# Patient Record
Sex: FEMALE | Race: WHITE | NOT HISPANIC OR LATINO | Employment: OTHER | URBAN - METROPOLITAN AREA
[De-identification: names, ages, dates, MRNs, and addresses within clinical notes are randomized per-mention and may not be internally consistent; named-entity substitution may affect disease eponyms.]

---

## 2017-11-24 ENCOUNTER — APPOINTMENT (OUTPATIENT)
Dept: RADIOLOGY | Facility: MEDICAL CENTER | Age: 77
DRG: 069 | End: 2017-11-24
Attending: EMERGENCY MEDICINE
Payer: MEDICARE

## 2017-11-24 ENCOUNTER — RESOLUTE PROFESSIONAL BILLING HOSPITAL PROF FEE (OUTPATIENT)
Dept: HOSPITALIST | Facility: MEDICAL CENTER | Age: 77
End: 2017-11-24
Payer: MEDICARE

## 2017-11-24 ENCOUNTER — HOSPITAL ENCOUNTER (OUTPATIENT)
Dept: RADIOLOGY | Facility: MEDICAL CENTER | Age: 77
End: 2017-11-24

## 2017-11-24 ENCOUNTER — HOSPITAL ENCOUNTER (INPATIENT)
Facility: MEDICAL CENTER | Age: 77
LOS: 2 days | DRG: 069 | End: 2017-11-26
Attending: EMERGENCY MEDICINE | Admitting: HOSPITALIST
Payer: MEDICARE

## 2017-11-24 DIAGNOSIS — I10 HYPERTENSION, UNSPECIFIED TYPE: ICD-10-CM

## 2017-11-24 DIAGNOSIS — R53.1 WEAKNESS: ICD-10-CM

## 2017-11-24 PROBLEM — I16.0 HYPERTENSIVE URGENCY: Status: ACTIVE | Noted: 2017-11-24

## 2017-11-24 PROBLEM — G45.9 TIA (TRANSIENT ISCHEMIC ATTACK): Status: ACTIVE | Noted: 2017-11-24

## 2017-11-24 LAB
ALBUMIN SERPL BCP-MCNC: 4 G/DL (ref 3.2–4.9)
ALBUMIN/GLOB SERPL: 1.1 G/DL
ALP SERPL-CCNC: 81 U/L (ref 30–99)
ALT SERPL-CCNC: 8 U/L (ref 2–50)
ANION GAP SERPL CALC-SCNC: 12 MMOL/L (ref 0–11.9)
APTT PPP: 26.2 SEC (ref 24.7–36)
AST SERPL-CCNC: 17 U/L (ref 12–45)
BASOPHILS # BLD AUTO: 1.4 % (ref 0–1.8)
BASOPHILS # BLD: 0.11 K/UL (ref 0–0.12)
BILIRUB SERPL-MCNC: 0.6 MG/DL (ref 0.1–1.5)
BNP SERPL-MCNC: 170 PG/ML (ref 0–100)
BUN SERPL-MCNC: 9 MG/DL (ref 8–22)
CALCIUM SERPL-MCNC: 9.9 MG/DL (ref 8.5–10.5)
CHLORIDE SERPL-SCNC: 103 MMOL/L (ref 96–112)
CO2 SERPL-SCNC: 24 MMOL/L (ref 20–33)
CREAT SERPL-MCNC: 0.73 MG/DL (ref 0.5–1.4)
EOSINOPHIL # BLD AUTO: 0.15 K/UL (ref 0–0.51)
EOSINOPHIL NFR BLD: 1.9 % (ref 0–6.9)
ERYTHROCYTE [DISTWIDTH] IN BLOOD BY AUTOMATED COUNT: 44.5 FL (ref 35.9–50)
GFR SERPL CREATININE-BSD FRML MDRD: >60 ML/MIN/1.73 M 2
GLOBULIN SER CALC-MCNC: 3.5 G/DL (ref 1.9–3.5)
GLUCOSE SERPL-MCNC: 87 MG/DL (ref 65–99)
HCT VFR BLD AUTO: 44.3 % (ref 37–47)
HGB BLD-MCNC: 15.4 G/DL (ref 12–16)
IMM GRANULOCYTES # BLD AUTO: 0.02 K/UL (ref 0–0.11)
IMM GRANULOCYTES NFR BLD AUTO: 0.3 % (ref 0–0.9)
INR PPP: 0.98 (ref 0.87–1.13)
LYMPHOCYTES # BLD AUTO: 2.82 K/UL (ref 1–4.8)
LYMPHOCYTES NFR BLD: 36.2 % (ref 22–41)
MCH RBC QN AUTO: 31 PG (ref 27–33)
MCHC RBC AUTO-ENTMCNC: 34.8 G/DL (ref 33.6–35)
MCV RBC AUTO: 89.3 FL (ref 81.4–97.8)
MONOCYTES # BLD AUTO: 0.58 K/UL (ref 0–0.85)
MONOCYTES NFR BLD AUTO: 7.4 % (ref 0–13.4)
NEUTROPHILS # BLD AUTO: 4.11 K/UL (ref 2–7.15)
NEUTROPHILS NFR BLD: 52.8 % (ref 44–72)
NRBC # BLD AUTO: 0 K/UL
NRBC BLD AUTO-RTO: 0 /100 WBC
PLATELET # BLD AUTO: 276 K/UL (ref 164–446)
PMV BLD AUTO: 9.2 FL (ref 9–12.9)
POTASSIUM SERPL-SCNC: 3.5 MMOL/L (ref 3.6–5.5)
PROT SERPL-MCNC: 7.5 G/DL (ref 6–8.2)
PROTHROMBIN TIME: 12.7 SEC (ref 12–14.6)
RBC # BLD AUTO: 4.96 M/UL (ref 4.2–5.4)
SODIUM SERPL-SCNC: 139 MMOL/L (ref 135–145)
TROPONIN I SERPL-MCNC: <0.01 NG/ML (ref 0–0.04)
TSH SERPL DL<=0.005 MIU/L-ACNC: 1.73 UIU/ML (ref 0.3–3.7)
WBC # BLD AUTO: 7.8 K/UL (ref 4.8–10.8)

## 2017-11-24 PROCEDURE — 99285 EMERGENCY DEPT VISIT HI MDM: CPT

## 2017-11-24 PROCEDURE — 94760 N-INVAS EAR/PLS OXIMETRY 1: CPT

## 2017-11-24 PROCEDURE — 770020 HCHG ROOM/CARE - TELE (206)

## 2017-11-24 PROCEDURE — 83880 ASSAY OF NATRIURETIC PEPTIDE: CPT

## 2017-11-24 PROCEDURE — 85025 COMPLETE CBC W/AUTO DIFF WBC: CPT

## 2017-11-24 PROCEDURE — 99223 1ST HOSP IP/OBS HIGH 75: CPT | Mod: AI,25 | Performed by: HOSPITALIST

## 2017-11-24 PROCEDURE — 93005 ELECTROCARDIOGRAM TRACING: CPT | Performed by: EMERGENCY MEDICINE

## 2017-11-24 PROCEDURE — 84443 ASSAY THYROID STIM HORMONE: CPT

## 2017-11-24 PROCEDURE — 85730 THROMBOPLASTIN TIME PARTIAL: CPT

## 2017-11-24 PROCEDURE — 83036 HEMOGLOBIN GLYCOSYLATED A1C: CPT

## 2017-11-24 PROCEDURE — 96365 THER/PROPH/DIAG IV INF INIT: CPT

## 2017-11-24 PROCEDURE — 85610 PROTHROMBIN TIME: CPT

## 2017-11-24 PROCEDURE — 99406 BEHAV CHNG SMOKING 3-10 MIN: CPT | Performed by: HOSPITALIST

## 2017-11-24 PROCEDURE — 80053 COMPREHEN METABOLIC PANEL: CPT

## 2017-11-24 PROCEDURE — 36415 COLL VENOUS BLD VENIPUNCTURE: CPT

## 2017-11-24 PROCEDURE — 84484 ASSAY OF TROPONIN QUANT: CPT

## 2017-11-24 PROCEDURE — 71010 DX-CHEST-PORTABLE (1 VIEW): CPT

## 2017-11-24 RX ORDER — ASPIRIN 81 MG/1
324 TABLET, CHEWABLE ORAL DAILY
Status: DISCONTINUED | OUTPATIENT
Start: 2017-11-25 | End: 2017-11-26 | Stop reason: HOSPADM

## 2017-11-24 RX ORDER — ATORVASTATIN CALCIUM 40 MG/1
40 TABLET, FILM COATED ORAL EVERY EVENING
Status: DISCONTINUED | OUTPATIENT
Start: 2017-11-24 | End: 2017-11-26 | Stop reason: HOSPADM

## 2017-11-24 RX ORDER — ASPIRIN 81 MG/1
81 TABLET, CHEWABLE ORAL EVERY MORNING
Status: ON HOLD | COMMUNITY
End: 2017-11-26

## 2017-11-24 RX ORDER — METOPROLOL SUCCINATE 100 MG/1
100 TABLET, EXTENDED RELEASE ORAL EVERY MORNING
Status: ON HOLD | COMMUNITY
End: 2023-03-10

## 2017-11-24 RX ORDER — LABETALOL HYDROCHLORIDE 5 MG/ML
10 INJECTION, SOLUTION INTRAVENOUS EVERY 4 HOURS PRN
Status: DISCONTINUED | OUTPATIENT
Start: 2017-11-24 | End: 2017-11-25

## 2017-11-24 RX ORDER — NICOTINE 21 MG/24HR
14 PATCH, TRANSDERMAL 24 HOURS TRANSDERMAL
Status: DISCONTINUED | OUTPATIENT
Start: 2017-11-25 | End: 2017-11-26 | Stop reason: HOSPADM

## 2017-11-24 RX ORDER — ASPIRIN 325 MG
325 TABLET ORAL DAILY
Status: DISCONTINUED | OUTPATIENT
Start: 2017-11-25 | End: 2017-11-26 | Stop reason: HOSPADM

## 2017-11-24 RX ORDER — ONDANSETRON 4 MG/1
4 TABLET, ORALLY DISINTEGRATING ORAL EVERY 4 HOURS PRN
Status: DISCONTINUED | OUTPATIENT
Start: 2017-11-24 | End: 2017-11-26 | Stop reason: HOSPADM

## 2017-11-24 RX ORDER — METOPROLOL SUCCINATE 50 MG/1
100 TABLET, EXTENDED RELEASE ORAL EVERY MORNING
Status: DISCONTINUED | OUTPATIENT
Start: 2017-11-25 | End: 2017-11-26 | Stop reason: HOSPADM

## 2017-11-24 RX ORDER — ACETAMINOPHEN 325 MG/1
650 TABLET ORAL EVERY 6 HOURS PRN
Status: DISCONTINUED | OUTPATIENT
Start: 2017-11-24 | End: 2017-11-26 | Stop reason: HOSPADM

## 2017-11-24 RX ORDER — POLYETHYLENE GLYCOL 3350 17 G/17G
1 POWDER, FOR SOLUTION ORAL
Status: DISCONTINUED | OUTPATIENT
Start: 2017-11-24 | End: 2017-11-26 | Stop reason: HOSPADM

## 2017-11-24 RX ORDER — ASPIRIN 81 MG/1
81 TABLET, CHEWABLE ORAL EVERY MORNING
Status: DISCONTINUED | OUTPATIENT
Start: 2017-11-25 | End: 2017-11-24

## 2017-11-24 RX ORDER — AMOXICILLIN 250 MG
2 CAPSULE ORAL 2 TIMES DAILY
Status: DISCONTINUED | OUTPATIENT
Start: 2017-11-25 | End: 2017-11-26 | Stop reason: HOSPADM

## 2017-11-24 RX ORDER — BISACODYL 10 MG
10 SUPPOSITORY, RECTAL RECTAL
Status: DISCONTINUED | OUTPATIENT
Start: 2017-11-24 | End: 2017-11-26 | Stop reason: HOSPADM

## 2017-11-24 RX ORDER — ASPIRIN 300 MG/1
300 SUPPOSITORY RECTAL DAILY
Status: DISCONTINUED | OUTPATIENT
Start: 2017-11-25 | End: 2017-11-26 | Stop reason: HOSPADM

## 2017-11-24 RX ORDER — ONDANSETRON 2 MG/ML
4 INJECTION INTRAMUSCULAR; INTRAVENOUS EVERY 4 HOURS PRN
Status: DISCONTINUED | OUTPATIENT
Start: 2017-11-24 | End: 2017-11-26 | Stop reason: HOSPADM

## 2017-11-24 RX ORDER — M-VIT,TX,IRON,MINS/CALC/FOLIC 27MG-0.4MG
1 TABLET ORAL EVERY MORNING
Status: DISCONTINUED | OUTPATIENT
Start: 2017-11-25 | End: 2017-11-26 | Stop reason: HOSPADM

## 2017-11-24 ASSESSMENT — LIFESTYLE VARIABLES
TOTAL SCORE: 0
EVER FELT BAD OR GUILTY ABOUT YOUR DRINKING: NO
EVER HAD A DRINK FIRST THING IN THE MORNING TO STEADY YOUR NERVES TO GET RID OF A HANGOVER: NO
DO YOU DRINK ALCOHOL: YES
EVER_SMOKED: YES
TOTAL SCORE: 0
HAVE YOU EVER FELT YOU SHOULD CUT DOWN ON YOUR DRINKING: NO
TOTAL SCORE: 0
HAVE PEOPLE ANNOYED YOU BY CRITICIZING YOUR DRINKING: NO
CONSUMPTION TOTAL: INCOMPLETE

## 2017-11-24 ASSESSMENT — COPD QUESTIONNAIRES
DURING THE PAST 4 WEEKS HOW MUCH DID YOU FEEL SHORT OF BREATH: NONE/LITTLE OF THE TIME
HAVE YOU SMOKED AT LEAST 100 CIGARETTES IN YOUR ENTIRE LIFE: YES
COPD SCREENING SCORE: 4
DO YOU EVER COUGH UP ANY MUCUS OR PHLEGM?: NO/ONLY WITH OCCASIONAL COLDS OR INFECTIONS

## 2017-11-25 PROBLEM — Z72.0 TOBACCO ABUSE: Status: ACTIVE | Noted: 2017-11-25

## 2017-11-25 LAB
ANION GAP SERPL CALC-SCNC: 11 MMOL/L (ref 0–11.9)
BUN SERPL-MCNC: 12 MG/DL (ref 8–22)
CALCIUM SERPL-MCNC: 9.7 MG/DL (ref 8.5–10.5)
CHLORIDE SERPL-SCNC: 101 MMOL/L (ref 96–112)
CO2 SERPL-SCNC: 23 MMOL/L (ref 20–33)
CREAT SERPL-MCNC: 0.75 MG/DL (ref 0.5–1.4)
ERYTHROCYTE [DISTWIDTH] IN BLOOD BY AUTOMATED COUNT: 44.8 FL (ref 35.9–50)
EST. AVERAGE GLUCOSE BLD GHB EST-MCNC: 111 MG/DL
GFR SERPL CREATININE-BSD FRML MDRD: >60 ML/MIN/1.73 M 2
GLUCOSE SERPL-MCNC: 118 MG/DL (ref 65–99)
HBA1C MFR BLD: 5.5 % (ref 0–5.6)
HCT VFR BLD AUTO: 43.1 % (ref 37–47)
HGB BLD-MCNC: 14.7 G/DL (ref 12–16)
LV EJECT FRACT  99904: 55
LV EJECT FRACT MOD 2C 99903: 71.63
LV EJECT FRACT MOD 4C 99902: 60.04
LV EJECT FRACT MOD BP 99901: 65.68
MAGNESIUM SERPL-MCNC: 1.9 MG/DL (ref 1.5–2.5)
MCH RBC QN AUTO: 30.7 PG (ref 27–33)
MCHC RBC AUTO-ENTMCNC: 34.1 G/DL (ref 33.6–35)
MCV RBC AUTO: 90 FL (ref 81.4–97.8)
PLATELET # BLD AUTO: 282 K/UL (ref 164–446)
PMV BLD AUTO: 9.4 FL (ref 9–12.9)
POTASSIUM SERPL-SCNC: 4.2 MMOL/L (ref 3.6–5.5)
RBC # BLD AUTO: 4.79 M/UL (ref 4.2–5.4)
SODIUM SERPL-SCNC: 135 MMOL/L (ref 135–145)
WBC # BLD AUTO: 8.9 K/UL (ref 4.8–10.8)

## 2017-11-25 PROCEDURE — A9270 NON-COVERED ITEM OR SERVICE: HCPCS | Performed by: HOSPITALIST

## 2017-11-25 PROCEDURE — 80048 BASIC METABOLIC PNL TOTAL CA: CPT

## 2017-11-25 PROCEDURE — 99233 SBSQ HOSP IP/OBS HIGH 50: CPT | Performed by: HOSPITALIST

## 2017-11-25 PROCEDURE — 700102 HCHG RX REV CODE 250 W/ 637 OVERRIDE(OP): Performed by: HOSPITALIST

## 2017-11-25 PROCEDURE — 36415 COLL VENOUS BLD VENIPUNCTURE: CPT

## 2017-11-25 PROCEDURE — 93306 TTE W/DOPPLER COMPLETE: CPT | Mod: 26 | Performed by: INTERNAL MEDICINE

## 2017-11-25 PROCEDURE — 83735 ASSAY OF MAGNESIUM: CPT

## 2017-11-25 PROCEDURE — G8978 MOBILITY CURRENT STATUS: HCPCS | Mod: CJ

## 2017-11-25 PROCEDURE — 700111 HCHG RX REV CODE 636 W/ 250 OVERRIDE (IP): Performed by: HOSPITALIST

## 2017-11-25 PROCEDURE — G8987 SELF CARE CURRENT STATUS: HCPCS | Mod: CI

## 2017-11-25 PROCEDURE — 97161 PT EVAL LOW COMPLEX 20 MIN: CPT

## 2017-11-25 PROCEDURE — G8988 SELF CARE GOAL STATUS: HCPCS | Mod: CI

## 2017-11-25 PROCEDURE — 770020 HCHG ROOM/CARE - TELE (206)

## 2017-11-25 PROCEDURE — 85027 COMPLETE CBC AUTOMATED: CPT

## 2017-11-25 PROCEDURE — 700111 HCHG RX REV CODE 636 W/ 250 OVERRIDE (IP): Performed by: FAMILY MEDICINE

## 2017-11-25 PROCEDURE — 97166 OT EVAL MOD COMPLEX 45 MIN: CPT

## 2017-11-25 PROCEDURE — 93306 TTE W/DOPPLER COMPLETE: CPT

## 2017-11-25 PROCEDURE — G8979 MOBILITY GOAL STATUS: HCPCS | Mod: CI

## 2017-11-25 RX ORDER — LABETALOL HYDROCHLORIDE 5 MG/ML
10 INJECTION, SOLUTION INTRAVENOUS EVERY 4 HOURS PRN
Status: DISCONTINUED | OUTPATIENT
Start: 2017-11-25 | End: 2017-11-26 | Stop reason: HOSPADM

## 2017-11-25 RX ORDER — POTASSIUM CHLORIDE 20 MEQ/1
20 TABLET, EXTENDED RELEASE ORAL 2 TIMES DAILY
Status: DISCONTINUED | OUTPATIENT
Start: 2017-11-25 | End: 2017-11-26 | Stop reason: HOSPADM

## 2017-11-25 RX ORDER — HYDRALAZINE HYDROCHLORIDE 20 MG/ML
10 INJECTION INTRAMUSCULAR; INTRAVENOUS ONCE
Status: COMPLETED | OUTPATIENT
Start: 2017-11-25 | End: 2017-11-25

## 2017-11-25 RX ORDER — HYDRALAZINE HYDROCHLORIDE 20 MG/ML
10 INJECTION INTRAMUSCULAR; INTRAVENOUS
Status: DISCONTINUED | OUTPATIENT
Start: 2017-11-25 | End: 2017-11-26 | Stop reason: HOSPADM

## 2017-11-25 RX ADMIN — NICOTINE 14 MG: 14 PATCH, EXTENDED RELEASE TRANSDERMAL at 05:24

## 2017-11-25 RX ADMIN — POTASSIUM CHLORIDE 20 MEQ: 1500 TABLET, EXTENDED RELEASE ORAL at 21:46

## 2017-11-25 RX ADMIN — ATORVASTATIN CALCIUM 40 MG: 40 TABLET, FILM COATED ORAL at 21:46

## 2017-11-25 RX ADMIN — METOPROLOL SUCCINATE 100 MG: 50 TABLET, EXTENDED RELEASE ORAL at 07:57

## 2017-11-25 RX ADMIN — ASPIRIN 325 MG: 325 TABLET, COATED ORAL at 07:57

## 2017-11-25 RX ADMIN — HYDRALAZINE HYDROCHLORIDE 10 MG: 20 INJECTION INTRAMUSCULAR; INTRAVENOUS at 01:40

## 2017-11-25 RX ADMIN — ENOXAPARIN SODIUM 40 MG: 100 INJECTION SUBCUTANEOUS at 07:58

## 2017-11-25 RX ADMIN — ONDANSETRON 4 MG: 4 TABLET, ORALLY DISINTEGRATING ORAL at 08:40

## 2017-11-25 RX ADMIN — POTASSIUM CHLORIDE 20 MEQ: 1500 TABLET, EXTENDED RELEASE ORAL at 10:56

## 2017-11-25 RX ADMIN — MULTIPLE VITAMINS W/ MINERALS TAB 1 TABLET: TAB at 07:58

## 2017-11-25 ASSESSMENT — COGNITIVE AND FUNCTIONAL STATUS - GENERAL
SUGGESTED CMS G CODE MODIFIER MOBILITY: CJ
MOBILITY SCORE: 22
DAILY ACTIVITIY SCORE: 23
CLIMB 3 TO 5 STEPS WITH RAILING: A LITTLE
HELP NEEDED FOR BATHING: A LITTLE
WALKING IN HOSPITAL ROOM: A LITTLE
SUGGESTED CMS G CODE MODIFIER DAILY ACTIVITY: CI

## 2017-11-25 ASSESSMENT — ENCOUNTER SYMPTOMS
EYES NEGATIVE: 1
ABDOMINAL PAIN: 0
CONSTIPATION: 0
NAUSEA: 1
DIARRHEA: 0
LOSS OF CONSCIOUSNESS: 0
PALPITATIONS: 0
HEMOPTYSIS: 0
DIZZINESS: 1
PSYCHIATRIC NEGATIVE: 1
CHILLS: 0
CARDIOVASCULAR NEGATIVE: 1
RESPIRATORY NEGATIVE: 1
SEIZURES: 0
FALLS: 0
DEPRESSION: 0
SPEECH CHANGE: 0
SHORTNESS OF BREATH: 0
HEARTBURN: 0
HEADACHES: 1
BLOOD IN STOOL: 0
COUGH: 0
DOUBLE VISION: 0
TREMORS: 1
CONSTITUTIONAL NEGATIVE: 1
MUSCULOSKELETAL NEGATIVE: 1
SENSORY CHANGE: 0
FOCAL WEAKNESS: 1
MYALGIAS: 0
TINGLING: 0
FEVER: 0
WHEEZING: 0
NAUSEA: 0
HEADACHES: 0
VOMITING: 0
BRUISES/BLEEDS EASILY: 0
DIAPHORESIS: 0

## 2017-11-25 ASSESSMENT — LIFESTYLE VARIABLES: SUBSTANCE_ABUSE: 0

## 2017-11-25 ASSESSMENT — PAIN SCALES - GENERAL
PAINLEVEL_OUTOF10: 0
PAINLEVEL_OUTOF10: 0

## 2017-11-25 ASSESSMENT — GAIT ASSESSMENTS
DISTANCE (FEET): 150
GAIT LEVEL OF ASSIST: STAND BY ASSIST

## 2017-11-25 ASSESSMENT — PATIENT HEALTH QUESTIONNAIRE - PHQ9
2. FEELING DOWN, DEPRESSED, IRRITABLE, OR HOPELESS: NOT AT ALL
SUM OF ALL RESPONSES TO PHQ9 QUESTIONS 1 AND 2: 0
1. LITTLE INTEREST OR PLEASURE IN DOING THINGS: NOT AT ALL
SUM OF ALL RESPONSES TO PHQ QUESTIONS 1-9: 0

## 2017-11-25 ASSESSMENT — ACTIVITIES OF DAILY LIVING (ADL): TOILETING: INDEPENDENT

## 2017-11-25 NOTE — ED NOTES
EKG completed. Labs drawn and sent. Pt. Updated on the POC per Dr. Weaver. X-Ray at bedside. Call light within reach. Will continue to monitor.   normal...

## 2017-11-25 NOTE — CONSULTS
Neuro phone conversation (Telemedicine CODE STROKE service):    Patient transferred from [facility] for inpatient neuro evaluation.  NOT a code stroke.   Recommended permissive HTN, MRI/brain and neuro checks on inpatient service overnight.    Please call IN-HOUSE neurology team for inpatient consult in AM.

## 2017-11-25 NOTE — ED NOTES
Susan Garcia  77 y.o.  Chief Complaint   Patient presents with   • Weakness     Per report from EMS, pt. had sudden onset of right sided weakness and facial droop starting at 9pm last night. Symptoms have since resolved. 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 Pt. is on 2.5 mg of nicardipine to treat elevated blood pressure that per report was in the 200s systollically prior to medication administration.     /76   Pulse 70   Temp 36.8 °C (98.2 °F)   Resp 16   Ht 1.524 m (5')   Wt 56.2 kg (124 lb)   SpO2 98%   BMI 24.22 kg/m²   Pt. Has a 20G IV in her left AC placed PTA.

## 2017-11-25 NOTE — ED NOTES
Med rec completed per pt at bedside  Allergies reviewed - NKDA  No ABX in last 30 days   Pt has her metoprolol bottle with her, reviewed and returned it to pt

## 2017-11-25 NOTE — PROGRESS NOTES
Report received at bedside, assumed care. Pt transported to Tele 7 via gurney. Ambulated to bed steady with handheld assist. A&O x 4. Tingling present in right hand. Declines pain. /112, pulse sustaining in 50's. On call hospitalist paged for Hydralazine, orders received to give one time dose. No other concerns, complains or distress. Tele box on. Chart reviewed. Bed in lowest position, treaded slipper sock on, and call light within reach.

## 2017-11-25 NOTE — PROGRESS NOTES
Bedside report received from night RN.  Pt assessed A&Ox4, no c/o pain, no sob noted.  POC discussed with pt, all questions answered.  Pt states all needs are met. CHair at bedside, patient wants to wait til 11 for family to arrive before getting up from bed.  Sitting up high in bed for breakfast. Bed strip alarm on, bed in lowest position, call light within reach.  Will continue to monitor

## 2017-11-25 NOTE — PROGRESS NOTES
[facility] Hospitalist Progress Note    Date of Service: 2017    Chief Complaint  77 y.o. female admitted 2017 with right-sided deficit.    Interval Problem Update  Patient this point is found to have right sided weakness as well as gait disturbance. Patient's past pointing is off the latency exam is positive on the right side. Patient displays pending a swallow evaluation. MRI of the head was done but reading is pending. No acute findings noted on initial evaluation. The patient's echo and carotid ultrasound is pending patient remains on statin and aspirin. Continue with PT OT evaluation and swallow evaluation. Currently patient is not in pain.    Consultants/Specialty  Neurology    Disposition  To be determined        Review of Systems   Constitutional: Negative.  Negative for chills, diaphoresis and fever.   HENT: Negative.    Eyes: Negative.  Negative for double vision.   Respiratory: Negative.  Negative for cough, hemoptysis and wheezing.    Cardiovascular: Negative.  Negative for chest pain, palpitations and leg swelling.   Gastrointestinal: Positive for nausea. Negative for abdominal pain, blood in stool, constipation, diarrhea, heartburn and vomiting.   Genitourinary: Negative.  Negative for frequency, hematuria and urgency.   Musculoskeletal: Negative.  Negative for joint pain.   Skin: Negative.  Negative for itching and rash.   Neurological: Positive for dizziness, tremors, focal weakness and headaches. Negative for seizures and loss of consciousness.   Endo/Heme/Allergies: Negative.  Does not bruise/bleed easily.   Psychiatric/Behavioral: Negative.  Negative for depression, substance abuse and suicidal ideas.      Physical Exam  Laboratory/Imaging   Hemodynamics  Temp (24hrs), Av.8 °C (98.2 °F), Min:36.4 °C (97.6 °F), Max:37.2 °C (98.9 °F)   Temperature: 36.4 °C (97.6 °F)  Pulse  Av.8  Min: 50  Max: 74 Heart Rate (Monitored): (!) 49  Blood Pressure : 157/75, NIBP: (!) 176/74       Respiratory      Respiration: 18, Pulse Oximetry: 94 %, O2 Daily Delivery Respiratory : Room Air with O2 Available     Work Of Breathing / Effort: Mild  RUL Breath Sounds: Clear, RML Breath Sounds: Clear, RLL Breath Sounds: Clear, PHIL Breath Sounds: Diminished, LLL Breath Sounds: Clear    Fluids    Intake/Output Summary (Last 24 hours) at 11/25/17 1429  Last data filed at 11/25/17 1000   Gross per 24 hour   Intake              480 ml   Output              800 ml   Net             -320 ml       Nutrition  Orders Placed This Encounter   Procedures   • Diet Order     Standing Status:   Standing     Number of Occurrences:   1     Order Specific Question:   Diet:     Answer:   Regular [1]     Physical Exam   Constitutional: She appears well-developed and well-nourished.   HENT:   Head: Normocephalic and atraumatic.   Right Ear: External ear normal.   Left Ear: External ear normal.   Nose: Nose normal.   Mouth/Throat: Oropharynx is clear and moist.   Eyes: Conjunctivae and EOM are normal. Pupils are equal, round, and reactive to light.   Neck: Normal range of motion. Neck supple. No JVD present. No thyromegaly present.   Cardiovascular: Normal rate and regular rhythm.    Murmur heard.  Pulmonary/Chest: Effort normal and breath sounds normal. She has no wheezes. She has no rales. She exhibits no tenderness.   Abdominal: Soft. Bowel sounds are normal. She exhibits no distension and no mass. There is no tenderness. There is no rebound and no guarding.   Genitourinary:   Genitourinary Comments: Combs catheter   Musculoskeletal: Normal range of motion. She exhibits no edema or tenderness.   Lymphadenopathy:     She has no cervical adenopathy.   Neurological: She is alert. She has normal reflexes. She is not disoriented. She displays tremor. No cranial nerve deficit or sensory deficit. She exhibits abnormal muscle tone. She displays no seizure activity. Coordination and gait abnormal. GCS eye subscore is 4. GCS verbal  subscore is 5. GCS motor subscore is 6.   Past pointing off on the right and there is a droop on the latency evaluation on the right    Skin: Skin is warm and dry. No rash noted. No erythema.   Psychiatric: She has a normal mood and affect. Her behavior is normal. Judgment and thought content normal.   Nursing note and vitals reviewed.      Recent Labs      11/24/17 2035 11/25/17 0914   WBC  7.8  8.9   RBC  4.96  4.79   HEMOGLOBIN  15.4  14.7   HEMATOCRIT  44.3  43.1   MCV  89.3  90.0   MCH  31.0  30.7   MCHC  34.8  34.1   RDW  44.5  44.8   PLATELETCT  276  282   MPV  9.2  9.4     Recent Labs      11/24/17 2035 11/25/17 0914   SODIUM  139  135   POTASSIUM  3.5*  4.2   CHLORIDE  103  101   CO2  24  23   GLUCOSE  87  118*   BUN  9  12   CREATININE  0.73  0.75   CALCIUM  9.9  9.7     Recent Labs      11/24/17 2035   APTT  26.2   INR  0.98     Recent Labs      11/24/17 2035   BNPBTYPENAT  170*              Assessment/Plan     * TIA (transient ischemic attack)- (present on admission)   Assessment & Plan    Patient reported to the emergency room with right-sided deficits. Today to right-sided deficits continue she has at this point a drift on the right side as well as pass pointing is off. She is weaker on that side. There is no oral droop so. Speech is normal. Dysphagia assessment needs to be done.  Patient had an MRI of the head and MRA of the head. I have evaluated these myself and do not find acute strokes on a due to facial radiology reading is pending.  Echocardiogram is pending as his carotid ultrasound.  Patient now remains on aspirin and statin.        Hypertensive urgency- (present on admission)   Assessment & Plan    Permissive hypertension is allowed with the possibility of a stroke.        Tobacco abuse- (present on admission)   Assessment & Plan    -nicotine replacement protocol and cessation education provided for more than 10 minutes, discussed options of nicotine patch, acupuncture, medical  treatment with wellbutrin and chantix. Discussed other options. Code 14531            Reviewed items::  EKG reviewed, Labs reviewed, Medications reviewed and Radiology images reviewed  Combs catheter::  No Combs  DVT prophylaxis pharmacological::  Enoxaparin (Lovenox)  Ulcer Prophylaxis::  Not indicated

## 2017-11-25 NOTE — PROGRESS NOTES
Two RN skin check: Ears red and blanching. All other areas of skin assessed, no areas of concern.

## 2017-11-25 NOTE — FACE TO FACE
Face to Face Note  -  Durable Medical Equipment    Sebastian Taylor M.D. - NPI: [id number]  I certify that this patient is under my care and that they had a durable medical equipment(DME)face to face encounter by myself that meets the physician DME face-to-face encounter requirements with this patient on:    Date of encounter:   Patient:                    MRN:                       YOB: 2018  Susan Garcia  [id number]  1940     The encounter with the patient was in whole, or in part, for the following medical condition, which is the primary reason for durable medical equipment:  Post-Op Surgery    I certify that, based on my findings, the following durable medical equipment is medically necessary:  Walkers.    HOME O2 Saturation Measurements:(Values must be present for Home Oxygen orders)         ,     ,         My Clinical findings support the need for the above equipment due to:  Abnormal Gait    Supporting Symptoms: patient has not ambulated well for months, she needs home health and walker to help

## 2017-11-25 NOTE — THERAPY
"Physical Therapy Evaluation completed.   Bed Mobility:  Supine to Sit: Supervised  Transfers: Sit to Stand: Stand by Assist  Gait: Level Of Assist: Stand by Assist with No Equipment Needed       Plan of Care: Additional visit should pt remain in-house.  Discharge Recommendations: Equipment: No Equipment Needed. Post-acute therapy Discharge to home with outpatient for additional skilled therapy services.    Pt presents with decreased functional mobiltiy most related to R UE dyscoordination. Pt has difficulty manipulating objects as well as feeding herself. Has been educated on basic coordination exercises to perform. Hopefully OT can see pt to address as well. During gait, pt without overt LOB and was able to perform without device. She appears to not require FWW, and does not want to use a cane. She does have a walking stick at home and is agreeable to use. Anticipate that with continued mobiltiy, pt will progress such that she will not even need a walking stick. For now, she is in agreement to use to prevent fall. Daughter present who was receptive to education as well. Should pt remain in-house, could benefit from additional visit for safety. If she returns home, she will benefit from referral to outpatient PT to address R UE.     See \"Rehab Therapy-Acute\" Patient Summary Report for complete documentation.     "

## 2017-11-25 NOTE — CARE PLAN
Problem: Communication:  Goal: The ability to communicate needs accurately and effectively will improve  No deficits noted on speech or communication    Problem: Mobility:  Goal: Capacity to carry out activities will improve  Pt ambulatory with assistance, PT/OT ordered for evaluation

## 2017-11-25 NOTE — H&P
Hospital Medicine History and Physical    Date of Service  11/24/2017    Chief Complaint  Chief Complaint   Patient presents with   • Weakness     Per report from EMS, pt. had sudden onset of right sided weakness and facial droop starting at 9pm last night. Symptoms have since resolved. 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 Pt. is on 2.5 mg of nicardipine to treat elevated blood pressure that per report was in the 200s systollically prior to medication administration.       History of Presenting Illness  77 y.o. female who presented 11/24/2017 after being transferred from an outside facility for CVA and concern of hypertensive emergency. She states that on the day of presentation she woke up at 1 AM to the restroom and noticed that her right leg was shuffling, weak and she felt unsteady. She didn't think much of it at the time and went back to bed. Then, in the morning she noticed that her right hand was also having difficulty with fine motor skills such as grabbing cups or writing. CT head at the outside facility was negative and she was out of the TPA window. She was noted to be significantly hypertensive to the systolic 200s and was started on nicardipine drip and transferred for further evaluation.       Primary Care Physician  No primary care provider on file.    Consultants  None    Code Status  DNR    Review of Systems  Review of Systems   Constitutional: Negative for chills and fever.   HENT: Positive for congestion (recent URI or head cold 3-4 days prior).    Respiratory: Negative for shortness of breath.    Cardiovascular: Negative for chest pain, palpitations and leg swelling.   Gastrointestinal: Negative for abdominal pain, nausea and vomiting.   Genitourinary: Negative for dysuria.   Musculoskeletal: Negative for falls and myalgias.   Neurological: Positive for dizziness and focal weakness. Negative for tingling, sensory change, speech change, loss of consciousness and headaches.    Psychiatric/Behavioral: Negative.    All other systems reviewed and are negative.       Past Medical History  Past Medical History:   Diagnosis Date   • Hypertension    • Stroke (CMS-HCC)     Previous CVA.       Surgical History  No past surgical history on file.    Medications  No current facility-administered medications on file prior to encounter.      No current outpatient prescriptions on file prior to encounter.       Family History  History reviewed. No pertinent family history.  Mother with history of CVA and heart disease    Social History  Social History   Substance Use Topics   • Smoking status: Former Smoker   • Smokeless tobacco: Never Used   • Alcohol use No   Current smoker but is intermittent. She states that she started when she was quite young.  Has 8 grandchildren. Originally from [place] but moved to [place] for 22 years and now back in [place] to be closer with her family.    Allergies  No Known Allergies     Physical Exam  Laboratory   Hemodynamics  Temp (24hrs), Av.7 °C (98.1 °F), Min:36.6 °C (97.9 °F), Max:36.8 °C (98.2 °F)   Temperature: 36.6 °C (97.9 °F)  Pulse  Av.3  Min: 50  Max: 70 Heart Rate (Monitored): (!) 49  Blood Pressure : (!) 179/112, NIBP: (!) 176/74      Respiratory      Respiration: 16, Pulse Oximetry: 97 %, O2 Daily Delivery Respiratory : Room Air with O2 Available     Work Of Breathing / Effort: Mild  RUL Breath Sounds: Diminished, RML Breath Sounds: Diminished, RLL Breath Sounds: Diminished, PHIL Breath Sounds: Diminished, LLL Breath Sounds: Diminished    Physical Exam   Constitutional: She is oriented to person, place, and time. She appears well-developed and well-nourished. No distress.   HENT:   Mouth/Throat: Oropharynx is clear and moist.   Eyes: EOM are normal. Pupils are equal, round, and reactive to light. No scleral icterus.   Neck: Normal range of motion. Neck supple.   Cardiovascular: Regular rhythm and intact distal pulses.  Bradycardia present.     Pulmonary/Chest: Breath sounds normal. No respiratory distress.   Abdominal: Soft. She exhibits no distension. There is no tenderness.   Musculoskeletal: She exhibits no edema.   Neurological: She is alert and oriented to person, place, and time. She is not disoriented. No cranial nerve deficit.   4 out of 5 strength in the right upper extremity, 5 out of 5 for all other extremities. Difficulty with fine motor skills, finger tapping is delayed.   Skin: Skin is warm and dry.   Psychiatric: She has a normal mood and affect. Her behavior is normal.   Vitals reviewed.      Recent Labs      11/24/17 2035   WBC  7.8   RBC  4.96   HEMOGLOBIN  15.4   HEMATOCRIT  44.3   MCV  89.3   MCH  31.0   MCHC  34.8   RDW  44.5   PLATELETCT  276   MPV  9.2     Recent Labs      11/24/17 2035   SODIUM  139   POTASSIUM  3.5*   CHLORIDE  103   CO2  24   GLUCOSE  87   BUN  9   CREATININE  0.73   CALCIUM  9.9     Recent Labs      11/24/17 2035   ALTSGPT  8   ASTSGOT  17   ALKPHOSPHAT  81   TBILIRUBIN  0.6   GLUCOSE  87     Recent Labs      11/24/17 2035   APTT  26.2   INR  0.98     Recent Labs      11/24/17 2035   BNPBTYPENAT  170*         Lab Results   Component Value Date    TROPONINI <0.01 11/24/2017     Urinalysis:  No results found for: SPECGRAVITY, GLUCOSEUR, KETONES, NITRITE, WBCURINE, RBCURINE, BACTERIA, EPITHELCELL     Imaging  DX-CHEST-PORTABLE (1 VIEW)   Final Result      No acute cardiopulmonary abnormality identified.      OUTSIDE IMAGES-CT HEAD   Final Result      MR-BRAIN-W/O    (Results Pending)   MR-MRA HEAD-W/O    (Results Pending)   Carotid Duplex ([facility] [place] [facility] Only) - Do not order if CTA - Neck done in ER    (Results Pending)   Echocardiogram Comp W/O cont    (Results Pending)        Assessment/Plan     I anticipate this patient will require at least two midnights for appropriate medical management, necessitating inpatient admission.    * TIA (transient ischemic attack)- (present on admission)    Assessment & Plan    Patient with sudden onset right-sided deficits that have since resolved. She is out of the TPA window and CT head was negative. Case was discussed by ERP with neurology who recommended systolic blood pressure to 180.  - Consult neurology if neurologic status changes or if studies return positive  - MRI/MRA pending  - Carotid Doppler and echo pending  - Start full dose aspirin and statin        Hypertensive urgency- (present on admission)   Assessment & Plan    Hypertensive urgency versus emergency. Patient meets criteria was significantly elevated blood pressures but it is unclear if her weakness/TIA symptoms was associated with her secondary to hypertensive encephalopathy. Typically, one would see global deficits was supposed to focal. Given the apparent TIA we will allow for permissive hypertension but only up to 180/100.  - Discontinue Nicardipine drip  - Continue home metoprolol  - Labetalol available when necessary  - May need to adjust home antihypertensive regimen prior to discharge        Tobacco abuse- (present on admission)   Assessment & Plan    Counseled at least 3 minutes on 11/25/17. Strongly encouraged to quit  - Nicotine replacement PRN            VTE prophylaxis: lovenox.

## 2017-11-25 NOTE — ASSESSMENT & PLAN NOTE
Patient reported to the emergency room with right-sided deficits. Today to right-sided deficits continue she has at this point a drift on the right side as well as pass pointing is off. She is weaker on that side. There is no oral droop so. Speech is normal. Dysphagia assessment needs to be done.  Patient had an MRI of the head and MRA of the head. I have evaluated these myself and do not find acute strokes on a due to facial radiology reading is pending.  Echocardiogram is pending as his carotid ultrasound.  Patient now remains on aspirin and statin.

## 2017-11-25 NOTE — ED PROVIDER NOTES
ED Provider Note    Scribed for Sri Weaver M.D. by Robin Pedroza. 11/24/2017  8:14 PM    Means of arrival: Ambulance  History obtained from: patient  History limited by: None    CHIEF COMPLAINT  Chief Complaint   Patient presents with   • Weakness     Per report from EMS, pt. had sudden onset of right sided weakness and facial droop starting at 9pm last night. Symptoms have since resolved. 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 Pt. is on 2.5 mg of nicardipine to treat elevated blood pressure that per report was in the 200s systollically prior to medication administration.     HPI  Susan Garcia is a 77 y.o. female who presents to the Emergency Department as a transfer from [facility] for acute onset right sided weakness, concerning for CVA, onset of symptoms 1AM this morning. Per patient, she noticed she was unable to write anything down and she was dragging her foot when she walked.  She denies having any pain with this event. Patient reports having a similar symptoms due to CVA secondary to hypertension in the past. These symptoms in the past were to her left side, however have since resolved.  She denies associated headache, neck pain, vision changes, chest pain, shortness of breath, abdominal pain, fever, nausea, vomiting, or diarrhea. Associated symptoms include ear pressure, runny nose, and cough. Patient states that she has been compliant with her home medications. She is only on Metoprolol and a baby Aspirin daily. Patient reports feeling much improved at this time. She was treated with Nicardipine prior to transfer.     REVIEW OF SYSTEMS  Pertinent positive include facial droop, focal weakness, cough, runny nose, and ear pressure.   Pertinent negative include headache, neck pain, vision changes, chest pain, shortness of breath, abdominal pain, fever, nausea, vomiting, or diarrhea.   All other systems reviewed and are negative.  C.     PAST MEDICAL HISTORY  Hypertension     SOCIAL HISTORY  Social  History     Social History Main Topics   • Smoking status: Former Smoker   • Smokeless tobacco: Never Used   • Alcohol use No   • Drug use: No     SURGICAL HISTORY  patient denies any surgical history    CURRENT MEDICATIONS  None    ALLERGIES  No Known Allergies    PHYSICAL EXAM   VITAL SIGNS: /76   Pulse 70   Temp 36.8 °C (98.2 °F)   Resp 16   Ht 1.524 m (5')   Wt 56.2 kg (124 lb)   SpO2 98%   BMI 24.22 kg/m²    Constitutional: Alert in no apparent distress. Elderly female resting comfortably in bed.  HENT: Normocephalic, Atraumatic. Bilateral external ears normal. Nose normal.  Moist mucous membranes.  Oropharynx clear.  Eyes: Pupils are equal and reactive. Conjunctiva normal.   Neck: Supple, full range of motion  Heart: Regular rate and rhythm.  3/6 systolic murmurs noted  Lungs: No respiratory distress, normal work of breathing. Lungs clear to auscultation bilaterally.  Abdomen Soft, no distention.  No tenderness to palpation.  Musculoskeletal: Atraumatic. No obvious deformities noted.  No lower extremity edema.  Skin: Warm, Dry.  No erythema, No rash.   Neurologic: Alert and oriented x3. Moving all extremities spontaneously without focal deficits. No pronator drift, CN II-XII intact, strength 4+/5 in RUE and RLE, Strength 5/5 on LLE and LUE, sensation intact. No dysmetria.   Psychiatric: Affect normal, Mood normal, Appears appropriate and not intoxicated.    ED COURSE & MEDICAL DECISION MAKING  Patient Vitals for the past 24 hrs:   BP Temp Pulse Resp SpO2 Height Weight   11/24/17 2013 155/76 36.8 °C (98.2 °F) 70 16 98 % - -   11/24/17 2008 - - - - - 1.524 m (5') 56.2 kg (124 lb)     Medications administered:      Labs and imaging personally reviewed:    EKG  EKG personally reviewed by myself in the absence of a cardiologist showed:  SB at 58  LAD, Normal intervals  No ectopy. LVH.   No ST changes.   Biphasic T waves in I and AVL.   Impression: no previous EKG for comparison. Non specific EKG with  nonspecific T wave changes and LVH.      LABS  Labs Reviewed   COMP METABOLIC PANEL - Abnormal; Notable for the following:        Result Value    Potassium 3.5 (*)     Anion Gap 12.0 (*)     All other components within normal limits    Narrative:     Indicate which anticoagulants the patient is on:->UNKNOWN   BTYPE NATRIURETIC PEPTIDE - Abnormal; Notable for the following:     B Natriuretic Peptide 170 (*)     All other components within normal limits    Narrative:     Indicate which anticoagulants the patient is on:->UNKNOWN   CBC WITH DIFFERENTIAL    Narrative:     Indicate which anticoagulants the patient is on:->UNKNOWN   TROPONIN    Narrative:     Indicate which anticoagulants the patient is on:->UNKNOWN   PROTHROMBIN TIME    Narrative:     Indicate which anticoagulants the patient is on:->UNKNOWN   APTT    Narrative:     Indicate which anticoagulants the patient is on:->UNKNOWN   ESTIMATED GFR    Narrative:     Indicate which anticoagulants the patient is on:->UNKNOWN   TSH   HEMOGLOBIN A1C   CBC WITHOUT DIFFERENTIAL   BASIC METABOLIC PANEL   LIPID PROFILE     RADIOLOGY  DX-CHEST-PORTABLE (1 VIEW)   Final Result      No acute cardiopulmonary abnormality identified.      OUTSIDE IMAGES-CT HEAD   Final Result      MR-BRAIN-W/O    (Results Pending)   MR-MRA HEAD-W/O    (Results Pending)   Carotid Duplex ([facility] Only) - Do not order if CTA - Neck done in ER    (Results Pending)   Echocardiogram Comp W/O cont    (Results Pending)     Old records personally reviewed:  Patient is a transfer [facility] for concern for stroke NIHSS of 2 due to right sided weakness. Significantly hypertensive and was started on nicardipine drip. Her BP there was initially 234/101 and her head CT was negative.     MDM:    8:14 PM Patient seen and examined at bedside. Ordered for chest x ray, CBC, CMP, troponin, BNP, INR, APTT, and EKG to evaluate. I discussed with the patient the treatment plan. She understands the  plan and is agreeable.    Patient with history of hypertension and possible prior stroke who was transferred from outside facility with concern for acute CVA outside of the treatment window for IV alteplase. Patient was initially hypertensive and started on a nicardipine drip at outside hospital.  On arrival here, patient has improvement of her symptoms. She has possible trace right upper and right lower extremity weakness when compared to the left, however no other focal neurologic deficits. Reviewed outside hospital CT scan without evidence of acute intracranial hemorrhage or mass. EKG without evidence of acute ischemia or arrhythmia, troponin negative, doubt ACS. Patient without significant electrolyte abnormalities or anemia.  Chest x-ray without evidence of pneumothorax, pneumonia, pulmonary edema.    8:30 PM I paged Neurology     8:35 PM I discussed the case with on call [facility], Dr. Barnes. Plan to stop nicardipine drip at this time as she does not need strict blood pressure control.  Will obtain MRI and MRA to assess for infarct or stenosis. Neurology may be consult in the morning.    9:55 PM Recheck with the patient. Off of her Nicardipine drip her blood pressure is 147 systolic.     9:56 PM I discussed the case with Dr. Pike Hospitalist. She is aware of the patient and agrees to admit the patient into her care.    FINAL IMPRESSION  1. Weakness    2. Hypertension, unspecified type      -ADMIT-    Results, diagnoses, and treatment options were discussed with the patient and/or family. Patient verbalized understanding of plan of care.    , Robin Pedroza (Breanna), am scribing for, and in the presence of, Sri Weaver M.D.    Electronically signed by: Robin Pedroza (Breanna), 11/24/2017    ISri M.D. personally performed the services described in this documentation, as scribed by Robin Pedroza in my presence, and it is both accurate and complete.    The note accurately reflects work  and decisions made by me.  Sri Weaver  11/25/2017  12:50 AM

## 2017-11-25 NOTE — ASSESSMENT & PLAN NOTE
-nicotine replacement protocol and cessation education provided for more than 10 minutes, discussed options of nicotine patch, acupuncture, medical treatment with wellbutrin and chantix. Discussed other options. Code 09417

## 2017-11-25 NOTE — ED NOTES
Pt transferred to [facility] RN aware of pt's pending arrival and BP status, all belongings accounted for.

## 2017-11-26 VITALS
TEMPERATURE: 97.4 F | WEIGHT: 116.62 LBS | OXYGEN SATURATION: 98 % | BODY MASS INDEX: 22.9 KG/M2 | DIASTOLIC BLOOD PRESSURE: 93 MMHG | SYSTOLIC BLOOD PRESSURE: 140 MMHG | HEART RATE: 88 BPM | RESPIRATION RATE: 18 BRPM | HEIGHT: 60 IN

## 2017-11-26 LAB
CHOLEST SERPL-MCNC: 182 MG/DL (ref 100–199)
HDLC SERPL-MCNC: 44 MG/DL
LDLC SERPL CALC-MCNC: 120 MG/DL
TRIGL SERPL-MCNC: 92 MG/DL (ref 0–149)

## 2017-11-26 PROCEDURE — 700102 HCHG RX REV CODE 250 W/ 637 OVERRIDE(OP): Performed by: HOSPITALIST

## 2017-11-26 PROCEDURE — A9270 NON-COVERED ITEM OR SERVICE: HCPCS | Performed by: HOSPITALIST

## 2017-11-26 PROCEDURE — 93880 EXTRACRANIAL BILAT STUDY: CPT

## 2017-11-26 PROCEDURE — 700111 HCHG RX REV CODE 636 W/ 250 OVERRIDE (IP): Performed by: HOSPITALIST

## 2017-11-26 PROCEDURE — 99239 HOSP IP/OBS DSCHRG MGMT >30: CPT | Performed by: HOSPITALIST

## 2017-11-26 PROCEDURE — 70551 MRI BRAIN STEM W/O DYE: CPT

## 2017-11-26 PROCEDURE — 36415 COLL VENOUS BLD VENIPUNCTURE: CPT

## 2017-11-26 PROCEDURE — 80061 LIPID PANEL: CPT

## 2017-11-26 PROCEDURE — 70544 MR ANGIOGRAPHY HEAD W/O DYE: CPT

## 2017-11-26 RX ORDER — ATORVASTATIN CALCIUM 80 MG/1
80 TABLET, FILM COATED ORAL EVERY EVENING
Qty: 30 TAB | Refills: 3 | Status: ON HOLD | OUTPATIENT
Start: 2017-11-26 | End: 2023-03-07

## 2017-11-26 RX ORDER — UBIDECARENONE 30 MG
100 CAPSULE ORAL DAILY
Qty: 30 CAP | Refills: 3 | Status: ON HOLD | OUTPATIENT
Start: 2017-11-26 | End: 2023-03-10

## 2017-11-26 RX ORDER — CLOPIDOGREL BISULFATE 75 MG/1
75 TABLET ORAL DAILY
Qty: 30 TAB | Refills: 0 | Status: SHIPPED | OUTPATIENT
Start: 2017-11-26

## 2017-11-26 RX ADMIN — MULTIPLE VITAMINS W/ MINERALS TAB 1 TABLET: TAB at 08:31

## 2017-11-26 RX ADMIN — ASPIRIN 325 MG: 325 TABLET, COATED ORAL at 08:31

## 2017-11-26 RX ADMIN — METOPROLOL SUCCINATE 100 MG: 50 TABLET, EXTENDED RELEASE ORAL at 08:35

## 2017-11-26 RX ADMIN — POTASSIUM CHLORIDE 20 MEQ: 1500 TABLET, EXTENDED RELEASE ORAL at 08:31

## 2017-11-26 RX ADMIN — ENOXAPARIN SODIUM 40 MG: 100 INJECTION SUBCUTANEOUS at 08:36

## 2017-11-26 RX ADMIN — NICOTINE 14 MG: 14 PATCH, EXTENDED RELEASE TRANSDERMAL at 06:32

## 2017-11-26 NOTE — CARE PLAN
Problem: Safety  Goal: Will remain free from injury  Pt up self, stable on feet.    Problem: Safety:  Goal: Will remain free from injury  Pt up self, stable on feet.

## 2017-11-26 NOTE — DISCHARGE INSTRUCTIONS
Discharge Instructions    Discharged to home by car with relative. Discharged via wheelchair, hospital escort: Yes.  Special equipment needed: Not Applicable    Be sure to schedule a follow-up appointment with your primary care doctor or any specialists as instructed.     Discharge Plan:   Diet Plan: Discussed  Activity Level: Discussed  Smoking Cessation Offered: Patient Refused  Confirmed Follow up Appointment: Patient to Call and Schedule Appointment  Confirmed Symptoms Management: Discussed  Medication Reconciliation Updated: Yes  Influenza Vaccine Indication: Not indicated: Previously immunized this influenza season and > 8 years of age    I understand that a diet low in cholesterol, fat, and sodium is recommended for good health. Unless I have been given specific instructions below for another diet, I accept this instruction as my diet prescription.   Other diet: CARDIAC.    Special Instructions: 1)  FOLLOW UP WITH YOUR DOCTORS AS SCHEDULED.                                      2)  TAKE ALL MEDICATIONS AS PRESCRIBED.    · Is patient discharged on Warfarin / Coumadin?   No     · Is patient Post Blood Transfusion?  No    Depression / Suicide Risk    As you are discharged from this [facility], it is important to learn how to keep safe from harming yourself.    Recognize the warning signs:  · Abrupt changes in personality, positive or negative- including increase in energy   · Giving away possessions  · Change in eating patterns- significant weight changes-  positive or negative  · Change in sleeping patterns- unable to sleep or sleeping all the time   · Unwillingness or inability to communicate  · Depression  · Unusual sadness, discouragement and loneliness  · Talk of wanting to die  · Neglect of personal appearance   · Rebelliousness- reckless behavior  · Withdrawal from people/activities they love  · Confusion- inability to concentrate     If you or a loved one observes any of these behaviors or has  concerns about self-harm, here's what you can do:  · Talk about it- your feelings and reasons for harming yourself  · Remove any means that you might use to hurt yourself (examples: pills, rope, extension cords, firearm)  · Get professional help from the community (Mental Health, Substance Abuse, psychological counseling)  · Do not be alone:Call your Safe Contact- someone whom you trust who will be there for you.  · Call your local CRISIS HOTLINE 863-9630 or 024-261-4743  · Call your local Children's Mobile Crisis Response Team [place] (449) 467-7992 or www.Teamer.net  · Call the toll free National Suicide Prevention Hotlines   · National Suicide Prevention Lifeline 963-060-QOKJ (2593)  · [employer] Line Network 800-SUICIDE (185-5608)        Rehabilitation After a Stroke, Adult  A stroke can cause many types of problems. The treatment of stroke involves three stages:  · Prevention.  · Treatment immediately following a stroke.  · Rehabilitation after a stroke.  HOW IS MY REHABILITATION PLAN DEVELOPED?  A detailed exam by your health care provider helps outline what problems were caused by the stroke. Your health care provider may consult specialists. The specialists may include doctors, occupational and physical therapists, and speech therapists. It is then possible to make a plan that best fits your needs.   Your evaluation might include the following:  · Evaluation of your ability to do daily activities that require using muscles, coordination, vision, reasoning, memory, and problem solving. Interviews with you and your health care provider will help determine what you could do and could not do before the stroke.  · Evaluation of your ability to do personal self-care tasks, such as dressing, grooming, and eating.  · Tests to see if there are sensory and motor changes due to the stroke, especially in the hands and legs.  WHAT ARE THE TYPES OF REHABILITATION?  Your health care provider may have you  start rehabilitation right away depending on the type and severity of your stroke. Rehabilitation after a stroke is focused on getting function back and preventing another stroke. Rehabilitation might include:   · Physical therapy. This can include help with walking, sitting, lying down, and balance. It may also be designed to help prevent shortening of the muscles (contractures) and swelling (edema).  · Occupational therapy. This therapy helps you to relearn skills needed for leading a normal life. These could include eating, using the restroom, dressing, and taking care of yourself. It helps to make you more independent.  · Vision therapy. This can help you to retrain, strengthen, and improve your vision after a stroke.  · Speech therapy. This can help to improve your speech and communication skills. It also teaches you and your family members to cope with problems of being unable to communicate.  · Cognitive therapy. This therapy can help with problems caused by lack of memory, attention, or concentration.  · Psychological or psychiatric therapy. This can help you cope with problems of frustration and emotional problems that may develop after a stroke.       This information is not intended to replace advice given to you by your health care provider. Make sure you discuss any questions you have with your health care provider.     Document Released: 01/06/2009 Document Revised: 05/03/2016 Document Reviewed: 05/22/2014  [employer] Interactive Patient Education ©2016 [employer]      Stroke Prevention  Some health problems and behaviors may make it more likely for you to have a stroke. Below are ways to lessen your risk of having a stroke.   · Be active for at least 30 minutes on most or all days.  · Do not smoke. Try not to be around others who smoke.  · Do not drink too much alcohol.  ¨ Do not have more than 2 drinks a day if you are a man.  ¨ Do not have more than 1 drink a day if you are a woman and are not  pregnant.  · Eat healthy foods, such as fruits and vegetables. If you were put on a specific diet, follow the diet as told.  · Keep your cholesterol levels under control through diet and medicines. Look for foods that are low in saturated fat, trans fat, cholesterol, and are high in fiber.  · If you have diabetes, follow all diet plans and take your medicine as told.  · Ask your doctor if you need treatment to lower your blood pressure. If you have high blood pressure (hypertension), follow all diet plans and take your medicine as told by your doctor.  · If you are 18-39 years old, have your blood pressure checked every 3-5 years. If you are age 40 or older, have your blood pressure checked every year.  · Keep a healthy weight. Eat foods that are low in calories, salt, saturated fat, trans fat, and cholesterol.  · Do not take drugs.  · Avoid birth control pills, if this applies. Talk to your doctor about the risks of taking birth control pills.  · Talk to your doctor if you have sleep problems (sleep apnea).  · Take all medicine as told by your doctor.  ¨ You may be told to take aspirin or blood thinner medicine. Take this medicine as told by your doctor.  ¨ Understand your medicine instructions.  · Make sure any other conditions you have are being taken care of.  GET HELP RIGHT AWAY IF:  · You suddenly lose feeling (you feel numb) or have weakness in your face, arm, or leg.  · Your face or eyelid hangs down to one side.  · You suddenly feel confused.  · You have trouble talking (aphasia) or understanding what people are saying.  · You suddenly have trouble seeing in one or both eyes.  · You suddenly have trouble walking.  · You are dizzy.  · You lose your balance or your movements are clumsy (uncoordinated).  · You suddenly have a very bad headache and you do not know the cause.  · You have new chest pain.  · Your heart feels like it is fluttering or skipping a beat (irregular heartbeat).  Do not wait to see if the  symptoms above go away. Get help right away. Call your local emergency services (911 in [place] Do not drive yourself to the hospital.     This information is not intended to replace advice given to you by your health care provider. Make sure you discuss any questions you have with your health care provider.     Document Released: 06/18/2013 Document Revised: 01/08/2016 Document Reviewed: 06/20/2014  [employer] Interactive Patient Education ©2016 [employer]

## 2017-11-26 NOTE — NON-PROVIDER
[facility] Progress Note    Date of Service: 11/26/2017    Chief Complaint  77 y.o. female admitted 11/24/2017 with acute onset right sided weakness    Interval Problem Update  Ptt progressed well overnight. Her coordination is improved on her right side as her finger nose test was negative bilaterally for any coordination deficiencies. She was able to get up and walk to the seat on the side of her room and back. She is able to grab objects with her right hand and has improved dexterity with her hands. Her blood pressure has increased however, since yesterday to 175/70 mmHg (yesterday 157/75 mmHg). Her pulse is currently low at 56 beats/min. Her carotid ultrasound was performed today and was found to have no evidence of stenosis bilaterally. Impression of the head CT are still pending. 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 MRI of the brain showed left frontal ischemia of the white matter deep to the precentral gyrus.     Consultants/Specialty  Neurology     Disposition  TBD        Review of Systems:      Review of Systems   Constitutional: Negative.  Negative for chills, diaphoresis and fever.   HENT: Negative for headaches, hearing problems, ear pain or discharge. Negative for dysphagia. Negative for smelling deficiencies and negative for vision problems or double vision.  Respiratory: Negative.  Negative for cough, hemoptysis and wheezing.    Cardiovascular: Negative.  Negative for chest pain, palpitations and leg swelling.   Gastrointestinal: Negative for nausea. Negative for abdominal pain, blood in stool, constipation, diarrhea, heartburn and vomiting.   Genitourinary: Negative.  Negative for frequency, hematuria and urgency.   Musculoskeletal: Negative.  Negative for joint pain.   Skin: Negative.  Negative for itching and rash.   Neurological: Negative for dizziness, headaches, tremors or focal weakness. Negative for seizures and loss of  consciousness.   Endo/Heme/Allergies: Negative.  Does not bruise/bleed easily.   Psychiatric/Behavioral: Negative.  Negative for depression, substance abuse and suicidal ideas.         Physical Exam  Laboratory/Imaging    Ga: Ptt was well groomed and responsive. Alert, aware and oriented x3.  Vitals:  Bp: 157/75 mmHg  Temp: 97.4 Farenheit  RR: 18 breaths/min  Pulse: 56 beats/min  Fluids    Intake/Output Summary (Last 24 hours) at 11/26/17 0839  Last data filed at 11/25/17 2300   Gross per 24 hour   Intake              480 ml   Output              600 ml   Net             -120 ml       Nutrition  Orders Placed This Encounter   Procedures   • Diet Order     Standing Status:   Standing     Number of Occurrences:   1     Order Specific Question:   Diet:     Answer:   Regular [1]     Physical Exam   Constitutional: She is oriented to person, place, and time. She appears well-developed and well-nourished. No distress.   HENT:   Head: Normocephalic and atraumatic.   Right Ear: External ear normal.   Left Ear: External ear normal.   Nose: Nose normal.   Mouth/Throat: Oropharynx is clear and moist. No oropharyngeal exudate.   Eyes: Conjunctivae and EOM are normal. Pupils are equal, round, and reactive to light. Right eye exhibits no discharge. Left eye exhibits no discharge. No scleral icterus.   Neck: Normal range of motion. Neck supple. No JVD present. No tracheal deviation present. No thyromegaly present.   Cardiovascular: Normal rate and regular rhythm.  Exam reveals no gallop and no friction rub.    Murmur (harsh holosystolic  heard best at apex) heard.  Pulmonary/Chest: Effort normal and breath sounds normal. No stridor. No respiratory distress. She has no wheezes. She has no rales. She exhibits no tenderness.   Abdominal: Soft. Bowel sounds are normal. She exhibits no distension and no mass. There is no tenderness. There is no rebound and no guarding.   Musculoskeletal: Normal range of motion. She exhibits no edema,  tenderness or deformity.   Lymphadenopathy:     She has no cervical adenopathy.   Neurological: She is alert and oriented to person, place, and time. She has normal reflexes. She displays normal reflexes. No cranial nerve deficit. She exhibits normal muscle tone. Coordination (slight coordination deficit on right upper extremity) abnormal.   Skin: Skin is warm and dry. No rash noted. She is not diaphoretic. No erythema. No pallor.   Psychiatric: She has a normal mood and affect. Her behavior is normal. Judgment and thought content normal.           Recent Labs      11/24/17 2035 11/25/17   0914   WBC  7.8  8.9   RBC  4.96  4.79   HEMOGLOBIN  15.4  14.7   HEMATOCRIT  44.3  43.1   MCV  89.3  90.0   MCH  31.0  30.7   MCHC  34.8  34.1   RDW  44.5  44.8   PLATELETCT  276  282   MPV  9.2  9.4     Recent Labs      11/24/17 2035 11/25/17   0914   SODIUM  139  135   POTASSIUM  3.5*  4.2   CHLORIDE  103  101   CO2  24  23   GLUCOSE  87  118*   BUN  9  12   CREATININE  0.73  0.75   CALCIUM  9.9  9.7     Recent Labs      11/24/17 2035   APTT  26.2   INR  0.98     Recent Labs      11/24/17 2035   BNPBTYPENAT  170*     Recent Labs      11/26/17   0304   TRIGLYCERIDE  92   HDL  44   LDL  120*       Labs  1) Lipid panel performed today showed an increased LDL (120 mg/dL).     Imaging  1) Carotid ultrasound- no evidence of stenosis bilaterally.   2) Head CT- pending.   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   4) MRI of the brain- left frontal ischemia of the white matter deep to the precentral gyrus.     All images and labs were reviewed by me.   Assessment/Plan     Assessment/Plan       1) Focal right sided weakness- Weakness is greatly improved on her right side. Continue with aspirin daily 324 mg. Carotid US was negative. MRI and MRI-A showed evidence of ischemia. Consult with neurologist for further evaluation.    2) Abnormal right sided coordination- Right sided coordination  greatly improved. Continue monitoring.   3) HTN emergency- BP in control, watch for trend in Bp, no further reduction needed to maintain adequate brain perfusion. Maintain current BP medications.   4)Tobacco Use- Patient is agreeing to smoking cessation. Begin CBT and start nicoderm patches.

## 2017-11-26 NOTE — PROGRESS NOTES
Report received, assumed care, assessment done. Pt aware of plan of care. Will monitor. Denies pain. Pt up self to BR. Stable on feet.

## 2017-11-26 NOTE — NON-PROVIDER
[facility] Progress Note    Date of Service: 11/25/2017    Chief Complaint  Right sided weakness  Interval Problem Update  77 y.o. female admitted 11/24/2017 with right-sided weakness since 11/24/2017. She had an episode of hypertensive emergency and her daughter noticed her having a right sided leg drag and facial droop. The daughter decided to transfer her via helicopter to [facility] for a CVA. Since, she has been having problems with intention as she is not able to grab objects well with her hand. She has problems with right sided coordination with finger nose exam.       Consultants/Specialty  none     Disposition  DNR        Review of Systems:  Constitutional: Negative for chills and fever.   HEENT: Negative for headaches, vision problems, hearing, smelling or swallowing. Negative for ear discharge or pain. Negative for nasal septal deviation.     Respiratory: Negative for shortness of breath.    Cardiovascular: Negative for chest pain, palpitations and leg swelling.   Gastrointestinal: Negative for abdominal pain, nausea and vomiting.   Genitourinary: Negative for dysuria.   Musculoskeletal: Negative for falls and myalgias.   Neurological: Positive for dizziness and focal weakness. Negative for tingling, sensory change, speech change, loss of consciousness and headaches.   Psychiatric/Behavioral: Negative for anxiety, depression or altered mental status.     Physical Exam  Laboratory/Imaging     Fluids    Intake/Output Summary (Last 24 hours) at 11/25/17 1861  Last data filed at 11/25/17 1000   Gross per 24 hour   Intake              480 ml   Output              800 ml   Net             -320 ml       Nutrition  Orders Placed This Encounter   Procedures   • Diet Order     Standing Status:   Standing     Number of Occurrences:   1     Order Specific Question:   Diet:     Answer:   Regular [1]     Physical Exam   Constitutional: She is oriented to person, place, and time. She appears well-developed and well-nourished.  No distress.   Eyes: Conjunctivae and EOM are normal. Pupils are equal, round, and reactive to light. Right eye exhibits no discharge. Left eye exhibits no discharge. No scleral icterus.   Neck: Normal range of motion. Neck supple. No JVD present. No tracheal deviation present. No thyromegaly present.   Cardiovascular: Normal rate, regular rhythm and intact distal pulses.  Exam reveals no gallop and no friction rub.    Murmur (Loud holosystolic) heard.  Pulmonary/Chest: Effort normal and breath sounds normal. No stridor. No respiratory distress. She has no wheezes. She has no rales. She exhibits no tenderness.   Abdominal: Soft. Bowel sounds are normal. She exhibits no distension and no mass. There is no tenderness. There is no rebound and no guarding.   Musculoskeletal: Normal range of motion. She exhibits no edema, tenderness or deformity.   Lymphadenopathy:     She has no cervical adenopathy.   Neurological: She is alert and oriented to person, place, and time. She has normal reflexes. She displays normal reflexes. No cranial nerve deficit. She exhibits normal muscle tone. Coordination abnormal.   Skin: Skin is warm and dry. No rash noted. She is not diaphoretic. No erythema. No pallor.   Psychiatric: She has a normal mood and affect. Her behavior is normal. Judgment and thought content normal.           Recent Labs      11/24/17 2035 11/25/17 0914   WBC  7.8  8.9   RBC  4.96  4.79   HEMOGLOBIN  15.4  14.7   HEMATOCRIT  44.3  43.1   MCV  89.3  90.0   MCH  31.0  30.7   MCHC  34.8  34.1   RDW  44.5  44.8   PLATELETCT  276  282   MPV  9.2  9.4     Recent Labs      11/24/17 2035 11/25/17   0914   SODIUM  139  135   POTASSIUM  3.5*  4.2   CHLORIDE  103  101   CO2  24  23   GLUCOSE  87  118*   BUN  9  12   CREATININE  0.73  0.75   CALCIUM  9.9  9.7     Recent Labs      11/24/17 2035   APTT  26.2   INR  0.98     Recent Labs      11/24/17 2035   BNPBTYPENAT  170*              Assessment/Plan     1) Focal  right sided weakness- Continue with aspirin daily 324 mg. Consult with neurology pending results of head MRI.   2) Abnormal right sided coordination- Continue to monitor and check for progression. If it does not resolve consult neurology for further evaluation.   3) HTN emergency- BP in control, no further reduction needed to maintain adequate brain perfusion. Maintain current BP medications.  4)Tobacco Use- Patient is agreeing to smoking cessation. Begin CBT and start nicoderm patches.

## 2017-11-26 NOTE — CARE PLAN
Problem: Safety  Goal: Will remain free from injury  Outcome: PROGRESSING AS EXPECTED      Problem: Infection  Goal: Will remain free from infection  Outcome: PROGRESSING AS EXPECTED      Problem: Safety:  Goal: Will remain free from injury  Outcome: PROGRESSING AS EXPECTED      Problem: Infection:  Goal: Will remain free from infection  Outcome: PROGRESSING AS EXPECTED

## 2017-11-26 NOTE — CARE PLAN
Problem: Psychosocial Needs:  Goal: Ability to identify and develop effective coping behavior will improve    Intervention: Assess psychological status  Pt with positive attitude about PT/OT for stroke rehab.

## 2017-11-26 NOTE — PROGRESS NOTES
Bedside report performed with [facility]. Pt is a/o, safety check performed, all possessions and call bell within reach. POC and doctors orders addressed as needed.

## 2017-11-26 NOTE — PROGRESS NOTES
Pt dc'd home with daughter via wheelchair to auto. VSS, pt HR in 50's, asymptomatic, MD aware. Pt aware to stop smoking and to follow up with PMD as soon as possible.

## 2017-11-26 NOTE — CONSULTS
NEUROLOGY CONSULT         CHIEF COMPLAINT: Right sided weakness.      Date of Service:  11/26/17    REQUESTING PHYSICIAN: Sebastian Taylor M.D.,       HISTORY OF PRESENT ILLNESS:  Susan Garcia is a 77 y.o. Female with history of HTN and prior CVA with left sided weakness that has resolved who presents with acute onset of left sided weakness who was transferred from [facility] for an acute onset of right sided weakness.  Symptoms started 9 PM, the night before she was admitted.    She has weakness in her right arm and leg, so that she was dropping things. She was still able to walk even though her right leg felt slightly weaker.  No headache,  visual changes, change in speech, numbness. She had an MRI of the brain which showed a small left frontal stroke in the deep white matter of the left frontal lobe.  MRA showed an occluded left vert, but otherwise patent posterior circulation.  Carotid dopplers were unremarkable. She does take ASA 81 mg daily.  She also admits to smoking a half a pack per day.          ROS:  Positive review of systems are in BOLD  Constitutional: no fevers, chills, night sweats, weight loss  HEENT: no headache, blurred vision, loss of vision, double vision, congestion, sore throat, rhinorrhea, dyssphagia, edema   Chest: no SOB, cough, wheezing,  Heart: no chest pain, palpitation, orthopnea,  Abd: no abdominal pain, nausea, vomiting, diarrrhea, constipation,   Genitourinary: no hematuria, urinary frequency or urgency, bladder or bowel incontinence  Extremities: no joint pain, muscle pain, back pain, neck pain,   Hematological: no epistaxis, easy bruising, petechia  Neurological: please see history of present illness  Psychiatric: no anxiety, depression  Extremities: no swelling, joint pain,   Endocrine: no polyuria, polydipsea, heat or cold intolerance, weight gain, weight loss  Skin: no lesions, rashes.       PAST MEDICAL HISTORY:  Past Medical History:   Diagnosis Date   •  Hypertension    • Stroke (CMS-HCC)     Previous CVA.       ALLERGIES:  No Known Allergies      MEDICATIONS:  ASA 81 mg daily   Metoprolol  mg daily     FAMILY HISTORY:  Father-a-fib, CAD  No family history of CVA    SOCIAL HISTORY:    Still smokes a pack per day  No ethoh use      EXAM:  Vitals:    11/25/17 2015 11/26/17 0000 11/26/17 0400 11/26/17 0800   BP: 148/69 156/84 (!) 175/70 140/93   Pulse:  (!) 59 (!) 56 88   Resp:  14 14 18   Temp:  36.5 °C (97.7 °F) 36.3 °C (97.4 °F) 36.3 °C (97.4 °F)   SpO2:  98% 97% 98%   Weight:       Height:         General: pt is lying in bed, NAD  HEENT: normocephalic, atraumatic, normal moist oral mucosa  EYes: anicteric, PERRLA, pupils midline  Neck: supple, no carotid bruits  Chest: CTA b/l no wheezing or rales  Heart: regular rate and rhythm, no murmurs  Abd: soft, non-tender, non distended  Extremtiies: no edema  Skin: no rashes or lesions  Neuro  General: pt is alert, oriented x 3, there is no aphasia, no dyarthria, no gross evidence of neglect, she answers questions appropriately  CN: visual fields are full to confrotation, EOMI, PERRLA, normal fundoscopic exam with no papilledma, facial sensation is intact b/l, there is no facial asymmetry, symmetrical palatal elevation, tongue is midline, sternocleidomastoid and trapezius strength are intact  Motor: normal tone. There is no drift in all 4 extremities.  Strength is 5/5 throughout in all 4 extremities  DTR: 2+ throughout  Plantar reflexes: downgoing b/l   Coordination: finger to nose and heel shin show no signs of ataxia b/l  Sensation: intact to pinprick, vibration and soft touch in all 4 extremities    LABORATORY TESTING  Lab Results   Component Value Date/Time    WBC 8.9 11/25/2017 09:14 AM    RBC 4.79 11/25/2017 09:14 AM    HEMOGLOBIN 14.7 11/25/2017 09:14 AM    HEMATOCRIT 43.1 11/25/2017 09:14 AM    MCV 90.0 11/25/2017 09:14 AM    MCH 30.7 11/25/2017 09:14 AM    MCHC 34.1 11/25/2017 09:14 AM    MPV 9.4  11/25/2017 09:14 AM    NEUTSPOLYS 52.80 11/24/2017 08:35 PM    LYMPHOCYTES 36.20 11/24/2017 08:35 PM    MONOCYTES 7.40 11/24/2017 08:35 PM    EOSINOPHILS 1.90 11/24/2017 08:35 PM    BASOPHILS 1.40 11/24/2017 08:35 PM        IMAGING:    MRI BRAIN:   FINDINGS:  There is a small area of true restricted diffusion in the LEFT frontal deep white matter deep to the precentral gyrus at the level of the body of the lateral ventricles. There is corresponding cytotoxic edema on the fluid sensitive sequences. There are   no other areas of acute ischemia.    There are scattered and partially confluent foci of T2 prolongation in the deep and periventricular white matter which are nonspecific but which most likely reflect areas of chronic microangiopathic ischemic change, though this can also be seen with   gliosis and demyelinating processes.    There is diffuse prominence of the CSF containing spaces.    The calvariae are unremarkable.  There are no extra-axial fluid collections.  The ventricular system and basal cisterns are within normal limits.  There are no other areas of abnormal signal in the brain substance.  There are no mass effects or shift of   midline structures.  There are no hemorrhagic lesions.  The brainstem and posterior fossa structures are unremarkable.    Vascular flow voids in the vertebrobasilar and carotid arteries, [place], and dural venous sinuses are intact.    The paranasal sinuses and mastoids in the field of view are unremarkable.  LEFT vertebral artery is dominant   Impression       1.  Small area of acute ischemia in the LEFT frontal white matter deep to the precentral gyrus  2.  No hemorrhage  3.  Moderate white matter changes  4.  Mild atrophy           MRA head ;  1.  Slow flow within or occlusion of the nondominant visualized RIGHT vertebral artery. This could be due to technical factors, particularly small vessel caliber though stenosis or occlusion are not excluded. Further  assessment could be performed with   neck CTA if clinically appropriate.  2.  Fetal origin of the PCA on each side as an anatomic variant  3.  Irregularity of the intracranial arteries likely related to atherosclerotic disease rather than vasculitis    Carotid dopplers: no significant stenosis of the carotid arteries b/l.      IMPRESSION AND PLAN: Susan Garcia is a 77 y.o. Female with history of HTN and prior CVA with left sided weakness that has resolved who presents with acute onset of left sided weakness who was transferred from [facility] for an acute onset of right sided weakness.  . She had an MRI of the brain which showed a small left frontal stroke in the deep white matter of the left frontal lobe.  MRA showed an occluded left vert, but otherwise patent posterior circulation.  Carotid dopplers were unremarkable. She does take ASA 81 mg daily.  She also admits to smoking a half a pack per day.      Her stoke was most likely small vessel in nature.     Recommend:  --switch ASA to Plavix 75 mg daily   --continue Atorvastastin 40 mg daily   -- pt was advised to stop smoking.  --she is safe to be discharged from the neurology standpoint    Emilee Castillo M.D.    Neurology

## 2017-11-26 NOTE — DISCHARGE SUMMARY
Cardiovascular risks related as he is as is as slow as is a soft systolic as his is as sore as it is as dry and so is unable to elicit more so as a loss as or CHIEF COMPLAINT ON ADMISSION  Chief Complaint   Patient presents with   • Weakness     Per report from EMS, pt. had sudden onset of right sided weakness and facial droop starting at 9pm last night. Symptoms have since resolved. 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 Pt. is on 2.5 mg of nicardipine to treat elevated blood pressure that per report was in the 200s systollically prior to medication administration.       CODE STATUS  DNR    HPI & HOSPITAL COURSE  This is a 77 y.o. female here with right-sided weakness. The patient was evaluated with a complete stroke workup. The MRI does show at this point a left frontal area ischemia but no acute stroke. Patient was placed at this point on Plavix per neurology recommendations I have counseled to Dr. Castillo who was kind enough to see the patient for us today. Patient was value by therapies and at this point she is ambulating independently she is at this point evaluate her with a swallow evaluation bedside she is at this point eating independently without aspiration or choking. Patient was value with ultrasound of carotids which do not show any significant plaque and she was a value with an echocardiogram which at this point is normal. Overall patient's condition has returned back to her baseline I did offer her skilled rehab as well as home health and rehab at home or outpatient rehab at this point the patient has declined this, she says she is doing well independently and she has family does close to her she did not want any therapy is coming to her house.    Therefore, she is discharged in good and stable condition with close outpatient follow-up.    SPECIFIC OUTPATIENT FOLLOW-UP  Follow with the primary care physician in 7-10 days.    DISCHARGE PROBLEM LIST  Principal Problem:    TIA (transient ischemic attack)  POA: Yes  Active Problems:    Hypertensive urgency POA: Yes    Tobacco abuse POA: Yes  Resolved Problems:    * No resolved hospital problems. *      FOLLOW UP  No future appointments.  Primary Care Provider    Schedule an appointment as soon as possible for a visit in 1 week  Hospital follow-up appointment with PCP      MEDICATIONS ON DISCHARGE   Susan Garcia   Home Medication Instructions MARÍA ELENA:[id number]    Printed on:11/26/17 0856   Medication Information                      atorvastatin (LIPITOR) 80 MG tablet  Take 1 Tab by mouth every evening.             clopidogrel (PLAVIX) 75 MG Tab  Take 1 Tab by mouth every day.             coenzyme Q-10 100 MG Cap capsule  Take 1 Cap by mouth every day.             metoprolol SR (TOPROL XL) 100 MG TABLET SR 24 HR  Take 100 mg by mouth every morning.             Multiple Vitamins-Minerals (EYE VITAMINS & MINERALS) Tab  Take 1 Tab by mouth every morning.                 DIET  Orders Placed This Encounter   Procedures   • Diet Order     Standing Status:   Standing     Number of Occurrences:   1     Order Specific Question:   Diet:     Answer:   Regular [1]       ACTIVITY  As tolerated.  Weight bearing as tolerated      CONSULTATIONS  Neurology, Dr. Castillo.    PROCEDURES  None    LABORATORY  Lab Results   Component Value Date/Time    SODIUM 135 11/25/2017 09:14 AM    POTASSIUM 4.2 11/25/2017 09:14 AM    CHLORIDE 101 11/25/2017 09:14 AM    CO2 23 11/25/2017 09:14 AM    GLUCOSE 118 (H) 11/25/2017 09:14 AM    BUN 12 11/25/2017 09:14 AM    CREATININE 0.75 11/25/2017 09:14 AM        Lab Results   Component Value Date/Time    WBC 8.9 11/25/2017 09:14 AM    HEMOGLOBIN 14.7 11/25/2017 09:14 AM    HEMATOCRIT 43.1 11/25/2017 09:14 AM    PLATELETCT 282 11/25/2017 09:14 AM        Total time of the discharge process exceeds 40 minutes

## 2017-12-13 LAB — EKG IMPRESSION: NORMAL

## 2017-12-24 NOTE — ADDENDUM NOTE
Encounter addended by: Aida Wahl on: 12/24/2017  2:01 PM<BR>    Actions taken: Charge Capture section accepted

## 2018-02-03 NOTE — THERAPY
"Occupational Therapy Evaluation completed.   Functional Status:  Supervision supine to sit, sit to stand.  Pt walked unit with SBA and no AD.  Pt demonstrates very slight weakness R hand and impaired coordination; pt reports dropping coffee and difficulty using utensils for ADL's.  Pt educated on continued use of R hand for self-care.  Pt issued foam ball and handout with coordination exercises; pt verbalized understanding.  Plan of Care: Will benefit from Occupational Therapy 1-2x more.  Discharge Recommendations:  Equipment: Shower Chair. Post-acute therapy Discharge to home with outpatient or home health for additional skilled therapy services.    See \"Rehab Therapy-Acute\" Patient Summary Report for complete documentation.    " "[facility] Hospital Medicine  Discharge Summary      Patient Name: Livan Arevalo  MRN: [id number]  Admission Date: 1/31/2018  Hospital Length of Stay: 3 days  Discharge Date and Time: 2/3/2018 11:35 AM  Attending Physician: Jessica att. providers found   Discharging Provider: Arnel Santana MD  Primary Care Provider: Primary Doctor Jessica  [facility] Medicine Team: [facility] HOSP MED A Arnel Santana MD    HPI:   This is a 38 yo M with h/o HTN, ESRD on HD MWF, aflutter, HFrEF ( EF 30%), anemia of chronic disease, who presents with c/o increasing erythema of his left AVF that started since Monday.  He reports AVF had purulent drainage two weeks ago, was given a course of abx with HD (Vancomycin) with some improvement, but had purulent drainage again from AVF. Patient reports he completed HD without complication Monday. Yesterday, he began to experience "itching" over his access site, and overnight he developed erythema and edema. Presents today with tennis ball-sized area of edema. Denies fever/chills, cough. Does report nausea over the last 2-3 days, with 2 episodes of emesis. Per ED, bedside u/s performed, appears to be hematoma vs. cellulitis. Blood cultures drawn, dose of vanc and cefepime given. Evaluated by Vascular Surgery, will plan for OR today for AVF removal and placement of temporary access.       Patient report at this time to not be taking any medications.       Procedure(s) (LRB):  EXCISION-ANEURYSM L AVF (Left)  INSERTION-CATHETER-HEMODIALYSIS FEMORAL (Right)      Hospital Course:   Admitted on 1/31, after he began to experience "itching" over his access site, and overnight he developed erythema and edema. He presented to [facility] ED on 1/31 with tennis ball-sized area of edema. He reports AVF had purulent drainage two weeks ago, was given a course of abx with HD (Vancomycin) with some improvement, but had purulent drainage again from AVF. Denies fever/chills, cough. Does report nausea over the last 2-3 " days, with 2 episodes of emesis. Bedside u/s performed, which appeared to be hematoma vs. cellulitis. Blood cultures drawn, dose of vanc and cefepime given. Evaluated by Vascular Surgery and underwent excision of his AVF 1/31 and temporary dialysis catheter was placed. Discussed with VIVI, plan to place perm-a-cath placed on 2/2/18 after dialysis. Patient is to get IV Vanc tonight. ID recommend 4 weeks of IV Vancomycin after HD. Prescription sent per SW. Will discharge home tomorrow. Pt request pain medication on discharge, I told him I will only give him a 7 days supply. CM setting up vascular surgery follow up.  2/3/18, doing well today, ready to be discharge home.        Consults:   Consults         Status Ordering Provider     Inpatient consult to Infectious Diseases  Once     Provider:  (Not yet assigned)    Completed LAXMI JEONG     Inpatient consult to Nephrology  Once     Provider:  (Not yet assigned)    Completed CHERY LAU     Inpatient consult to Vascular Surgery  Once     Provider:  (Not yet assigned)    Completed CHERY LAU          Dialysis AV fistula infection, initial encounter  - Increasing erythema of his left AVF with likely AVF infection. With recent infection treated with IV (Vanc per pt). S/P OR for AVF excision/ligation 1/31 by Vascular Surgery, placement of temporary HD catheter in R groin  - Blood cultures x2: NGTD for the last 3 days  - permacath placement 2/2/18 by VIVI  - redose vanc post HD 2/2/18  - Appreciate ID rec, Given his history of concern for intravascular infection that improved on vanco, would continue vanco regardless of negative cxs.   - Plan for duration of 4 weeks, given the fact of recent 2 week course of IV Vanc  - prescription sent to his HD Unit, vanc to be given MWF  - d/c home 2/3/2018     Essential hypertension  - no longer on medication per medication  - CTM     ESRD (end stage renal disease)  - Nephrology consulted for HD  - VIVI plan for  permacath  placement when stable, maybe 2/2/18  - presciption given for  sevelemer     Chronic systolic heart failure  - stable at this time  - report not taking any medications     Mass of right chest wall, chronic  - s/p biopsy per note  - outpatient follow up      Final Active Diagnoses:    Diagnosis Date Noted POA    PRINCIPAL PROBLEM:  Dialysis AV fistula infection, initial encounter [T82.7XXA] 01/31/2018 Yes    Chronic systolic heart failure [I50.22] 04/24/2017 Yes     Chronic    Mass of right chest wall [R22.2] 04/22/2017 Yes     Chronic    Essential hypertension [I10] 03/18/2014 Yes     Chronic    ESRD (end stage renal disease) [N18.6] 03/18/2014 Yes     Chronic      Problems Resolved During this Admission:    Diagnosis Date Noted Date Resolved POA       Discharged Condition: stable    Disposition: Home or Self Care    Follow Up:  Follow-up Information     MIGUEL Grover Iii, MD. Call in 1 week.    Specialty:  Vascular Surgery  Why:  Post-op  Contact information:  [facility] [id number] [phone]                 Patient Instructions:     VAS US Vein Mapping   Standing Status: Future  Standing Exp. Date: 02/02/19   Order Comments: Please map bilateral upper extremities.     Diet renal     Activity as tolerated         Significant Diagnostic Studies: Labs:   BMP:   Recent Labs  Lab 02/02/18  0411 02/03/18  0515   GLU 67* 65*   * 135*   K 4.9 4.8    99   CO2 19* 24   BUN 48* 48*   CREATININE 9.1* 9.9*   CALCIUM 9.8 9.3   , CMP   Recent Labs  Lab 02/02/18  0411 02/03/18  0515   * 135*   K 4.9 4.8    99   CO2 19* 24   GLU 67* 65*   BUN 48* 48*   CREATININE 9.1* 9.9*   CALCIUM 9.8 9.3   PROT 9.3* 8.4   ALBUMIN 2.7* 2.5*   BILITOT 0.8 1.0   ALKPHOS 236* 403*   AST 17 26   ALT 7* 10   ANIONGAP 16 12   ESTGFRAFRICA 7.5* 6.8*   EGFRNONAA 6.5* 5.9*    and All labs within the past 24 hours have been reviewed  Microbiology:   Blood Culture   Lab Results   Component Value Date     LABBLOO No Growth to date 01/31/2018    LABBLOO No Growth to date 01/31/2018    LABBLOO No Growth to date 01/31/2018       Pending Diagnostic Studies:     None         Medications:  Reconciled Home Medications:   Discharge Medication List as of 2/3/2018  9:54 AM      START taking these medications    Details   oxyCODONE-acetaminophen (PERCOCET)  mg per tablet Take 1 tablet by mouth every 8 (eight) hours as needed for Pain (Take for severe pain)., Starting Sat 2/3/2018, Print      VANCOMYCIN HCL (VANCOMYCIN 1 G/250 ML NS, READY TO MIX SYSTEM,) Inject 250 mLs (1,000 mg total) into the vein every Mon, Wed, Fri. Given after Dialysis. Last dose, 2/21/18, Starting Fri 2/2/2018, No Print         CONTINUE these medications which have CHANGED    Details   sevelamer carbonate (RENVELA) 800 mg Tab Take 2 tablets (1,600 mg total) by mouth 3 (three) times daily with meals. Contact Nephrologist for refills, Starting Sat 2/3/2018, Until Sun 2/3/2019, Print             Indwelling Lines/Drains at time of discharge:   Lines/Drains/Airways     Central Venous Catheter Line                 Hemodialysis Catheter 02/02/18 1224 right femoral 1 day           Time spent on the discharge of patient: 25 minutes  Patient was seen and examined on the date of discharge and determined to be suitable for discharge.         Arnel Santana MD  Department of Hospital Medicine  [facility]

## 2023-03-07 ENCOUNTER — APPOINTMENT (OUTPATIENT)
Dept: RADIOLOGY | Facility: MEDICAL CENTER | Age: 83
DRG: 494 | End: 2023-03-07
Attending: STUDENT IN AN ORGANIZED HEALTH CARE EDUCATION/TRAINING PROGRAM
Payer: MEDICARE

## 2023-03-07 ENCOUNTER — HOSPITAL ENCOUNTER (INPATIENT)
Facility: MEDICAL CENTER | Age: 83
LOS: 3 days | DRG: 494 | End: 2023-03-10
Attending: INTERNAL MEDICINE | Admitting: INTERNAL MEDICINE
Payer: MEDICARE

## 2023-03-07 DIAGNOSIS — I35.0 AORTIC VALVE STENOSIS, SEVERE: ICD-10-CM

## 2023-03-07 DIAGNOSIS — S82.892G CLOSED FRACTURE OF LEFT ANKLE WITH DELAYED HEALING: ICD-10-CM

## 2023-03-07 PROBLEM — I10 PRIMARY HYPERTENSION: Status: ACTIVE | Noted: 2023-03-07

## 2023-03-07 LAB
ANION GAP SERPL CALC-SCNC: 11 MMOL/L (ref 7–16)
BUN SERPL-MCNC: 12 MG/DL (ref 8–22)
CALCIUM SERPL-MCNC: 8.9 MG/DL (ref 8.5–10.5)
CHLORIDE SERPL-SCNC: 107 MMOL/L (ref 96–112)
CO2 SERPL-SCNC: 21 MMOL/L (ref 20–33)
CREAT SERPL-MCNC: 0.78 MG/DL (ref 0.5–1.4)
EKG IMPRESSION: NORMAL
ERYTHROCYTE [DISTWIDTH] IN BLOOD BY AUTOMATED COUNT: 43.2 FL (ref 35.9–50)
GFR SERPLBLD CREATININE-BSD FMLA CKD-EPI: 75 ML/MIN/1.73 M 2
GLUCOSE SERPL-MCNC: 115 MG/DL (ref 65–99)
HCT VFR BLD AUTO: 30.6 % (ref 37–47)
HGB BLD-MCNC: 10.6 G/DL (ref 12–16)
INR PPP: 1.04 (ref 0.87–1.13)
MCH RBC QN AUTO: 31 PG (ref 27–33)
MCHC RBC AUTO-ENTMCNC: 34.6 G/DL (ref 33.6–35)
MCV RBC AUTO: 89.5 FL (ref 81.4–97.8)
PLATELET # BLD AUTO: 230 K/UL (ref 164–446)
PMV BLD AUTO: 9.4 FL (ref 9–12.9)
POTASSIUM SERPL-SCNC: 4.2 MMOL/L (ref 3.6–5.5)
PROTHROMBIN TIME: 13.5 SEC (ref 12–14.6)
RBC # BLD AUTO: 3.42 M/UL (ref 4.2–5.4)
SODIUM SERPL-SCNC: 139 MMOL/L (ref 135–145)
WBC # BLD AUTO: 12 K/UL (ref 4.8–10.8)

## 2023-03-07 PROCEDURE — 36415 COLL VENOUS BLD VENIPUNCTURE: CPT

## 2023-03-07 PROCEDURE — 27829 TREAT LOWER LEG JOINT: CPT | Mod: 80ROC,LT | Performed by: STUDENT IN AN ORGANIZED HEALTH CARE EDUCATION/TRAINING PROGRAM

## 2023-03-07 PROCEDURE — 93005 ELECTROCARDIOGRAM TRACING: CPT | Performed by: HOSPITALIST

## 2023-03-07 PROCEDURE — 80048 BASIC METABOLIC PNL TOTAL CA: CPT

## 2023-03-07 PROCEDURE — 700102 HCHG RX REV CODE 250 W/ 637 OVERRIDE(OP): Performed by: HOSPITALIST

## 2023-03-07 PROCEDURE — 27822 TREATMENT OF ANKLE FRACTURE: CPT | Mod: 80ROC,LT | Performed by: STUDENT IN AN ORGANIZED HEALTH CARE EDUCATION/TRAINING PROGRAM

## 2023-03-07 PROCEDURE — 770020 HCHG ROOM/CARE - TELE (206)

## 2023-03-07 PROCEDURE — 99223 1ST HOSP IP/OBS HIGH 75: CPT | Mod: AI | Performed by: HOSPITALIST

## 2023-03-07 PROCEDURE — 93010 ELECTROCARDIOGRAM REPORT: CPT | Performed by: INTERNAL MEDICINE

## 2023-03-07 PROCEDURE — 73610 X-RAY EXAM OF ANKLE: CPT | Mod: LT

## 2023-03-07 PROCEDURE — 85610 PROTHROMBIN TIME: CPT

## 2023-03-07 PROCEDURE — 85027 COMPLETE CBC AUTOMATED: CPT

## 2023-03-07 PROCEDURE — 99222 1ST HOSP IP/OBS MODERATE 55: CPT | Performed by: STUDENT IN AN ORGANIZED HEALTH CARE EDUCATION/TRAINING PROGRAM

## 2023-03-07 PROCEDURE — A9270 NON-COVERED ITEM OR SERVICE: HCPCS | Performed by: HOSPITALIST

## 2023-03-07 RX ORDER — ONDANSETRON 2 MG/ML
4 INJECTION INTRAMUSCULAR; INTRAVENOUS EVERY 4 HOURS PRN
Status: DISCONTINUED | OUTPATIENT
Start: 2023-03-07 | End: 2023-03-10 | Stop reason: HOSPADM

## 2023-03-07 RX ORDER — NICOTINE 21 MG/24HR
14 PATCH, TRANSDERMAL 24 HOURS TRANSDERMAL
Status: DISCONTINUED | OUTPATIENT
Start: 2023-03-07 | End: 2023-03-10 | Stop reason: HOSPADM

## 2023-03-07 RX ORDER — ACETAMINOPHEN 325 MG/1
650 TABLET ORAL EVERY 6 HOURS PRN
Status: DISCONTINUED | OUTPATIENT
Start: 2023-03-07 | End: 2023-03-09

## 2023-03-07 RX ORDER — LOSARTAN POTASSIUM 50 MG/1
100 TABLET ORAL DAILY
Status: DISCONTINUED | OUTPATIENT
Start: 2023-03-07 | End: 2023-03-08

## 2023-03-07 RX ORDER — AMLODIPINE BESYLATE 10 MG/1
10 TABLET ORAL DAILY
Status: ON HOLD | COMMUNITY
End: 2023-03-10

## 2023-03-07 RX ORDER — MORPHINE SULFATE 4 MG/ML
2 INJECTION INTRAVENOUS EVERY 4 HOURS PRN
Status: DISCONTINUED | OUTPATIENT
Start: 2023-03-07 | End: 2023-03-09

## 2023-03-07 RX ORDER — POLYETHYLENE GLYCOL 3350 17 G/17G
1 POWDER, FOR SOLUTION ORAL
Status: DISCONTINUED | OUTPATIENT
Start: 2023-03-07 | End: 2023-03-10 | Stop reason: HOSPADM

## 2023-03-07 RX ORDER — AMLODIPINE BESYLATE 5 MG/1
10 TABLET ORAL DAILY
Status: DISCONTINUED | OUTPATIENT
Start: 2023-03-07 | End: 2023-03-09

## 2023-03-07 RX ORDER — ONDANSETRON 4 MG/1
4 TABLET, ORALLY DISINTEGRATING ORAL EVERY 4 HOURS PRN
Status: DISCONTINUED | OUTPATIENT
Start: 2023-03-07 | End: 2023-03-10 | Stop reason: HOSPADM

## 2023-03-07 RX ORDER — BISACODYL 10 MG
10 SUPPOSITORY, RECTAL RECTAL
Status: DISCONTINUED | OUTPATIENT
Start: 2023-03-07 | End: 2023-03-10 | Stop reason: HOSPADM

## 2023-03-07 RX ORDER — METOPROLOL SUCCINATE 100 MG/1
100 TABLET, EXTENDED RELEASE ORAL EVERY MORNING
Status: DISCONTINUED | OUTPATIENT
Start: 2023-03-08 | End: 2023-03-08

## 2023-03-07 RX ORDER — LOSARTAN POTASSIUM 100 MG/1
100 TABLET ORAL DAILY
Status: ON HOLD | COMMUNITY
End: 2023-03-10 | Stop reason: SDUPTHER

## 2023-03-07 RX ORDER — AMOXICILLIN 250 MG
2 CAPSULE ORAL 2 TIMES DAILY
Status: DISCONTINUED | OUTPATIENT
Start: 2023-03-08 | End: 2023-03-10 | Stop reason: HOSPADM

## 2023-03-07 RX ORDER — HYDRALAZINE HYDROCHLORIDE 20 MG/ML
10 INJECTION INTRAMUSCULAR; INTRAVENOUS EVERY 4 HOURS PRN
Status: DISCONTINUED | OUTPATIENT
Start: 2023-03-07 | End: 2023-03-09

## 2023-03-07 RX ORDER — CHOLECALCIFEROL (VITAMIN D3) 50 MCG
2000 TABLET ORAL DAILY
COMMUNITY

## 2023-03-07 RX ADMIN — AMLODIPINE BESYLATE 10 MG: 5 TABLET ORAL at 17:22

## 2023-03-07 RX ADMIN — LOSARTAN POTASSIUM 100 MG: 50 TABLET, FILM COATED ORAL at 17:22

## 2023-03-07 RX ADMIN — ACETAMINOPHEN 650 MG: 325 TABLET, FILM COATED ORAL at 19:40

## 2023-03-07 RX ADMIN — MAGNESIUM HYDROXIDE 30 ML: 400 SUSPENSION ORAL at 23:01

## 2023-03-07 ASSESSMENT — COGNITIVE AND FUNCTIONAL STATUS - GENERAL
DRESSING REGULAR UPPER BODY CLOTHING: A LOT
SUGGESTED CMS G CODE MODIFIER DAILY ACTIVITY: CK
WALKING IN HOSPITAL ROOM: A LOT
MOBILITY SCORE: 13
STANDING UP FROM CHAIR USING ARMS: A LITTLE
DRESSING REGULAR LOWER BODY CLOTHING: A LOT
CLIMB 3 TO 5 STEPS WITH RAILING: TOTAL
HELP NEEDED FOR BATHING: A LOT
TURNING FROM BACK TO SIDE WHILE IN FLAT BAD: A LITTLE
MOVING TO AND FROM BED TO CHAIR: A LOT
MOVING FROM LYING ON BACK TO SITTING ON SIDE OF FLAT BED: A LOT
SUGGESTED CMS G CODE MODIFIER MOBILITY: CL
DAILY ACTIVITIY SCORE: 16
PERSONAL GROOMING: A LITTLE
TOILETING: A LITTLE

## 2023-03-07 ASSESSMENT — ENCOUNTER SYMPTOMS
ORTHOPNEA: 0
CLAUDICATION: 0
BLURRED VISION: 0
PALPITATIONS: 0
WEIGHT LOSS: 0
HEMOPTYSIS: 0
VOMITING: 0
DEPRESSION: 0
SPUTUM PRODUCTION: 0
DOUBLE VISION: 0
PHOTOPHOBIA: 0
TREMORS: 0
DIARRHEA: 0
MYALGIAS: 1
COUGH: 0
FEVER: 0
DIZZINESS: 0
TINGLING: 0
EYE PAIN: 0
HEARTBURN: 0
ABDOMINAL PAIN: 0
HALLUCINATIONS: 0
HEADACHES: 0
CHILLS: 0

## 2023-03-07 ASSESSMENT — PATIENT HEALTH QUESTIONNAIRE - PHQ9
5. POOR APPETITE OR OVEREATING: MORE THAN HALF THE DAYS
2. FEELING DOWN, DEPRESSED, IRRITABLE, OR HOPELESS: SEVERAL DAYS
9. THOUGHTS THAT YOU WOULD BE BETTER OFF DEAD, OR OF HURTING YOURSELF: NOT AT ALL
1. LITTLE INTEREST OR PLEASURE IN DOING THINGS: NOT AT ALL
6. FEELING BAD ABOUT YOURSELF - OR THAT YOU ARE A FAILURE OR HAVE LET YOURSELF OR YOUR FAMILY DOWN: NOT AL ALL
SUM OF ALL RESPONSES TO PHQ QUESTIONS 1-9: 9
SUM OF ALL RESPONSES TO PHQ9 QUESTIONS 1 AND 2: 0
SUM OF ALL RESPONSES TO PHQ9 QUESTIONS 1 AND 2: 4
3. TROUBLE FALLING OR STAYING ASLEEP OR SLEEPING TOO MUCH: SEVERAL DAYS
4. FEELING TIRED OR HAVING LITTLE ENERGY: SEVERAL DAYS
2. FEELING DOWN, DEPRESSED, IRRITABLE, OR HOPELESS: NOT AT ALL
8. MOVING OR SPEAKING SO SLOWLY THAT OTHER PEOPLE COULD HAVE NOTICED. OR THE OPPOSITE, BEING SO FIGETY OR RESTLESS THAT YOU HAVE BEEN MOVING AROUND A LOT MORE THAN USUAL: NOT AT ALL
7. TROUBLE CONCENTRATING ON THINGS, SUCH AS READING THE NEWSPAPER OR WATCHING TELEVISION: SEVERAL DAYS
1. LITTLE INTEREST OR PLEASURE IN DOING THINGS: NEARLY EVERY DAY

## 2023-03-07 ASSESSMENT — LIFESTYLE VARIABLES
DOES PATIENT WANT TO STOP DRINKING: NO
EVER FELT BAD OR GUILTY ABOUT YOUR DRINKING: NO
TOTAL SCORE: 0
SUBSTANCE_ABUSE: 0
ALCOHOL_USE: NO
DOES PATIENT WANT TO STOP DRINKING: NO
CONSUMPTION TOTAL: INCOMPLETE
EVER FELT BAD OR GUILTY ABOUT YOUR DRINKING: NO
EVER HAD A DRINK FIRST THING IN THE MORNING TO STEADY YOUR NERVES TO GET RID OF A HANGOVER: NO
ALCOHOL_USE: NO
HAVE PEOPLE ANNOYED YOU BY CRITICIZING YOUR DRINKING: NO
HAVE YOU EVER FELT YOU SHOULD CUT DOWN ON YOUR DRINKING: NO
TOTAL SCORE: 0
HAVE YOU EVER FELT YOU SHOULD CUT DOWN ON YOUR DRINKING: NO
CONSUMPTION TOTAL: INCOMPLETE
TOTAL SCORE: 0
HAVE PEOPLE ANNOYED YOU BY CRITICIZING YOUR DRINKING: NO
EVER HAD A DRINK FIRST THING IN THE MORNING TO STEADY YOUR NERVES TO GET RID OF A HANGOVER: NO
TOTAL SCORE: 0

## 2023-03-07 ASSESSMENT — PAIN DESCRIPTION - PAIN TYPE
TYPE: ACUTE PAIN
TYPE: ACUTE PAIN

## 2023-03-07 NOTE — PROGRESS NOTES
[facility] HOSPITALIST TRIAGE OFFICER DIRECT ADMISSION REPORT  Transferring facility: [facility]  Transferring physician: Dr Acuna  Transferring facility/physician contact number:   Chief complaint: L ankle pain  Pertinent history & patient course: 83 year old female with a PMH of CAD, CABG, AVR with porcine valve, CVA on asa and plavix who presented with left ankle injury and found to have comminuted L ankle fracture. Patient was planned by surgery however she was noted to have a loud audible murmur. Echo showed severe AS, anesthesia felt patient is high risk and would need cardiac clearance and possible ICU monitoring post op. Patient is currently hemodynamically stable on room air. Aspirin and plavix were held for surgery. She does not have any stents but has bovine aortic valve that is now critically stenosed.   Pertinent imaging & lab results: n/a  Code Status:  per transferring provider, I personally verified with the transferring provider patient's code status and the transferring provider has confirmed this with the patient.  Further work up or recommendations per triage officer prior to transfer: n/a  Consultants called prior to transfer and pertinent input from consultants: none  Patient accepted for transfer: Yes  Consultants to be called upon arrival: Ortho and Cardiology  Admission status: Inpatient.   Floor requested: Telemetry  If ICU transfer, name of intensivist case discussed with and pertinent input from critical care: n/a    Please inform the triage officer upon arrival of the patient to [facility] for assignment of a hospitalist to perform admission.     For any question or concerns regarding the care of this patient, please reach out to the assigned hospitalist.

## 2023-03-07 NOTE — PROGRESS NOTES
4 Eyes Skin Assessment Completed by HILDA Griffith and HILDA Marquis.    Head WDL  Ears WDL  Nose WDL  Mouth WDL  Neck WDL  Breast/Chest WDL  Shoulder Blades WDL  Spine WDL  (R) Arm/Elbow/Hand WDL  (L) Arm/Elbow/Hand WDL  Abdomen WDL  Groin Redness  Scrotum/Coccyx/Buttocks Redness  (R) Leg Edema  (L) Leg Edema  (R) Heel/Foot/Toe WDL  (L) Heel/Foot/Toe WDL          Devices In Places Tele Box      Interventions In Place Q2 Turns    Possible Skin Injury No    Pictures Uploaded Into Epic N/A  Wound Consult Placed N/A  RN Wound Prevention Protocol Ordered No

## 2023-03-07 NOTE — CARE PLAN
The patient is Stable - Low risk of patient condition declining or worsening    Shift Goals  Clinical Goals: cardiac monitoring  Patient Goals: get moving  Family Goals: carl    Progress made toward(s) clinical / shift goals:  monitor on     Patient is not progressing towards the following goals: refusing  Problem: Knowledge Deficit - Standard  Goal: Patient and family/care givers will demonstrate understanding of plan of care, disease process/condition, diagnostic tests and medications  Outcome: Progressing     Problem: Skin Integrity  Goal: Skin integrity is maintained or improved  Outcome: Progressing    ambulation

## 2023-03-07 NOTE — H&P
Hospital Medicine History & Physical Note    Date of Service  3/7/2023    Primary Care Physician  No primary care provider on file.    Consultants  cardiology    Specialist Names: keny lynne    Code Status  Full Code    Chief Complaint    Left ankle pain      History of Presenting Illness  Susan Garcia is a 83 y.o. female who presented 3/7/2023 with past medical history  of CABG 2 vessel, stroke x2, hypertension, hyperlipidemia who sustained a fall 4 days prior to her transfer .She was seen in a [facility] and diagnosed with a trimalleolar fracture of the left ankle.  The patient was given pain management and splinted and bandaged.The plan was for patient to be taken to the operating room at that facility however due to her extensive cardiac history ,an echocardiogram was performed. The echocardiogram showed moderate aortic stenosis.  Patient was transferred to [facility] for further evaluation with cardiology prior to any operative treatment    Dr. Gavin of cardiology will review the case and give his input    Dr. Anirudh Lynne of orthopedics will review the case.      X-rays from the [facility] shows    Trimalleolar communited fracture with sublaxion.      There is also an incidental finding of a low sodium of 120 at the time of her admission at [facility].  Patient was on hydrochlorothiazide for hypertension and this was thought to be the cause.  Hydrochlorothiazide was held and it was noted that the patient's sodium was increasing however there was not a documented updated sodium level that I could find in the transfer documents.    A stat BMP was ordered-current sodium level is 139    On plavix- last taken 4 days ago    I discussed the plan of care with patient.    Review of Systems  Review of Systems   Constitutional:  Negative for chills, fever, malaise/fatigue and weight loss.   HENT:  Negative for ear discharge, ear pain, hearing loss,  nosebleeds and tinnitus.    Eyes:  Negative for blurred vision, double vision, photophobia and pain.   Respiratory:  Negative for cough, hemoptysis and sputum production.    Cardiovascular:  Negative for chest pain, palpitations, orthopnea and claudication.   Gastrointestinal:  Negative for abdominal pain, diarrhea, heartburn and vomiting.   Genitourinary:  Negative for dysuria, frequency, hematuria and urgency.   Musculoskeletal:  Positive for joint pain and myalgias.   Neurological:  Negative for dizziness, tingling, tremors and headaches.   Psychiatric/Behavioral:  Negative for depression, hallucinations, substance abuse and suicidal ideas.    All other systems reviewed and are negative.    Past Medical History   has a past medical history of Hypertension and Stroke (CMS-HCC) (HCC).    Cva x 2    hyperlipidemia    Surgical History      Cabg x 2  Porcine aortic valve      Family History    Family history reviewed with patient. There is no family history that is pertinent to the chief complaint.     Social History     1 PPD x 60 years-still smoking    No history of alcohol abuse or illicit drugs    Lives at home with daughter    Allergies  No Known Allergies    Medications    PTA    Losartan 100mg po daily    Asa 81    Toprol xl 100 qd    Hct 25 qd    Plavix 75 qd  Prior to Admission Medications   Prescriptions Last Dose Informant Patient Reported? Taking?   Multiple Vitamins-Minerals (EYE VITAMINS & MINERALS) Tab   Yes No   Sig: Take 1 Tab by mouth every morning.   atorvastatin (LIPITOR) 80 MG tablet   No No   Sig: Take 1 Tab by mouth every evening.   clopidogrel (PLAVIX) 75 MG Tab   No No   Sig: Take 1 Tab by mouth every day.   coenzyme Q-10 100 MG Cap capsule   No No   Sig: Take 1 Cap by mouth every day.   metoprolol SR (TOPROL XL) 100 MG TABLET SR 24 HR   Yes No   Sig: Take 100 mg by mouth every morning.      Facility-Administered Medications: None       Physical Exam  Temp:  [36.3 °C (97.3 °F)] 36.3 °C (97.3  °F)  Pulse:  [62] 62  Resp:  [16] 16  BP: (121)/(65) 121/65  SpO2:  [98 %] 98 %  Blood Pressure : 121/65   Temperature: 36.3 °C (97.3 °F)   Pulse: 62   Respiration: 16   Pulse Oximetry: 98 %       Physical Exam  Constitutional:       General: She is not in acute distress.     Appearance: She is not ill-appearing, toxic-appearing or diaphoretic.   HENT:      Head: Normocephalic.      Nose: Nose normal.   Eyes:      General: No scleral icterus.        Left eye: No discharge.      Extraocular Movements: Extraocular movements intact.      Pupils: Pupils are equal, round, and reactive to light.   Cardiovascular:      Rate and Rhythm: Normal rate and regular rhythm.      Heart sounds: Murmur heard.   Pulmonary:      Effort: No respiratory distress.      Breath sounds: No stridor. No wheezing, rhonchi or rales.   Abdominal:      General: There is no distension.      Palpations: There is no mass.      Tenderness: There is no abdominal tenderness. There is no guarding or rebound.      Hernia: No hernia is present.   Musculoskeletal:         General: Signs of injury present.      Cervical back: Normal range of motion.      Comments: Left lower foot is bandaged in a supportive brace   Skin:     Capillary Refill: Capillary refill takes 2 to 3 seconds.      Coloration: Skin is not jaundiced or pale.      Findings: No bruising, erythema, lesion or rash.   Neurological:      General: No focal deficit present.      Mental Status: She is oriented to person, place, and time.      Cranial Nerves: No cranial nerve deficit.      Sensory: No sensory deficit.      Motor: No weakness.      Coordination: Coordination normal.      Gait: Gait normal.      Deep Tendon Reflexes: Reflexes normal.   Psychiatric:         Mood and Affect: Mood normal.       Laboratory:          No results for input(s): ALTSGPT, ASTSGOT, ALKPHOSPHAT, TBILIRUBIN, DBILIRUBIN, GAMMAGT, AMYLASE, LIPASE, ALB, PREALBUMIN, GLUCOSE in the last 72 hours.      No results for  input(s): NTPROBNP in the last 72 hours.      No results for input(s): TROPONINT in the last 72 hours.    Imaging:  No orders to display     Echo ef > 75%  Lvh  Moderate AS with peak velocity 3.5 and mean gradient of 21 and aortic valve area 0.7  Wbc  11      Hct 29     EKG:  My impression is: Sinus bradycardia rate of 59.  No ST or T wave changes consistent with ischemia    Assessment/Plan:  Justification for Admission Status  I anticipate this patient will require at least two midnights for appropriate medical management, necessitating inpatient admission because patient require greater than 48 hours for further evaluation by cardiology regards to her attic stenosis and possible operative repair of her left ankle        * Closed fracture of left ankle with delayed healing- (present on admission)  Assessment & Plan  Pain control with Tylenol    N.p.o. after midnight pending orthopedic evaluation    Bedrest    Primary hypertension- (present on admission)  Assessment & Plan  Currently controlled    Continue Cozaar and Toprol XL and norvasc    Hold hydrochlorothiazide    Aortic valve stenosis, severe- (present on admission)  Assessment & Plan  The valvular diameter at this time 0.7 characterized the stenosis to moderate to severe    Cardiology will comment on recommendations prior to possible surgery    Continue beta-blocker    Control BP    Tobacco abuse- (present on admission)  Assessment & Plan  Smoking cessation advised    Nicotine patch ordered        VTE prophylaxis: SCDs/TEDs

## 2023-03-07 NOTE — PROGRESS NOTES
Med rec completed per patient and medication list at bedside (List returned)  Allergies reviewed  No PO Antibiotics in the last 30 days

## 2023-03-08 ENCOUNTER — ANESTHESIA (OUTPATIENT)
Dept: SURGERY | Facility: MEDICAL CENTER | Age: 83
DRG: 494 | End: 2023-03-08
Payer: MEDICARE

## 2023-03-08 ENCOUNTER — APPOINTMENT (OUTPATIENT)
Dept: RADIOLOGY | Facility: MEDICAL CENTER | Age: 83
DRG: 494 | End: 2023-03-08
Attending: ORTHOPAEDIC SURGERY
Payer: MEDICARE

## 2023-03-08 ENCOUNTER — ANESTHESIA EVENT (OUTPATIENT)
Dept: SURGERY | Facility: MEDICAL CENTER | Age: 83
DRG: 494 | End: 2023-03-08
Payer: MEDICARE

## 2023-03-08 LAB
ANION GAP SERPL CALC-SCNC: 11 MMOL/L (ref 7–16)
BUN SERPL-MCNC: 10 MG/DL (ref 8–22)
CALCIUM SERPL-MCNC: 8.8 MG/DL (ref 8.5–10.5)
CHLORIDE SERPL-SCNC: 103 MMOL/L (ref 96–112)
CO2 SERPL-SCNC: 23 MMOL/L (ref 20–33)
CREAT SERPL-MCNC: 0.84 MG/DL (ref 0.5–1.4)
ERYTHROCYTE [DISTWIDTH] IN BLOOD BY AUTOMATED COUNT: 43.9 FL (ref 35.9–50)
GFR SERPLBLD CREATININE-BSD FMLA CKD-EPI: 68 ML/MIN/1.73 M 2
GLUCOSE SERPL-MCNC: 101 MG/DL (ref 65–99)
HCT VFR BLD AUTO: 30.6 % (ref 37–47)
HGB BLD-MCNC: 10.7 G/DL (ref 12–16)
MCH RBC QN AUTO: 31.6 PG (ref 27–33)
MCHC RBC AUTO-ENTMCNC: 35 G/DL (ref 33.6–35)
MCV RBC AUTO: 90.3 FL (ref 81.4–97.8)
PLATELET # BLD AUTO: 235 K/UL (ref 164–446)
PMV BLD AUTO: 9.5 FL (ref 9–12.9)
POTASSIUM SERPL-SCNC: 3.7 MMOL/L (ref 3.6–5.5)
RBC # BLD AUTO: 3.39 M/UL (ref 4.2–5.4)
SODIUM SERPL-SCNC: 137 MMOL/L (ref 135–145)
WBC # BLD AUTO: 10.4 K/UL (ref 4.8–10.8)

## 2023-03-08 PROCEDURE — 160048 HCHG OR STATISTICAL LEVEL 1-5: Performed by: ORTHOPAEDIC SURGERY

## 2023-03-08 PROCEDURE — 700111 HCHG RX REV CODE 636 W/ 250 OVERRIDE (IP)

## 2023-03-08 PROCEDURE — 99140 ANES COMP EMERGENCY COND: CPT | Performed by: ANESTHESIOLOGY

## 2023-03-08 PROCEDURE — 0QSH04Z REPOSITION LEFT TIBIA WITH INTERNAL FIXATION DEVICE, OPEN APPROACH: ICD-10-PCS | Performed by: ORTHOPAEDIC SURGERY

## 2023-03-08 PROCEDURE — 99100 ANES PT EXTEME AGE<1 YR&>70: CPT | Performed by: ANESTHESIOLOGY

## 2023-03-08 PROCEDURE — 160009 HCHG ANES TIME/MIN: Performed by: ORTHOPAEDIC SURGERY

## 2023-03-08 PROCEDURE — 82652 VIT D 1 25-DIHYDROXY: CPT

## 2023-03-08 PROCEDURE — 73610 X-RAY EXAM OF ANKLE: CPT | Mod: LT

## 2023-03-08 PROCEDURE — 27822 TREATMENT OF ANKLE FRACTURE: CPT | Mod: LT | Performed by: ORTHOPAEDIC SURGERY

## 2023-03-08 PROCEDURE — 160041 HCHG SURGERY MINUTES - EA ADDL 1 MIN LEVEL 4: Performed by: ORTHOPAEDIC SURGERY

## 2023-03-08 PROCEDURE — C1713 ANCHOR/SCREW BN/BN,TIS/BN: HCPCS | Performed by: ORTHOPAEDIC SURGERY

## 2023-03-08 PROCEDURE — 700111 HCHG RX REV CODE 636 W/ 250 OVERRIDE (IP): Performed by: ANESTHESIOLOGY

## 2023-03-08 PROCEDURE — A9270 NON-COVERED ITEM OR SERVICE: HCPCS | Performed by: ANESTHESIOLOGY

## 2023-03-08 PROCEDURE — 700101 HCHG RX REV CODE 250: Performed by: ORTHOPAEDIC SURGERY

## 2023-03-08 PROCEDURE — 700105 HCHG RX REV CODE 258: Performed by: ORTHOPAEDIC SURGERY

## 2023-03-08 PROCEDURE — 36415 COLL VENOUS BLD VENIPUNCTURE: CPT

## 2023-03-08 PROCEDURE — 160036 HCHG PACU - EA ADDL 30 MINS PHASE I: Performed by: ORTHOPAEDIC SURGERY

## 2023-03-08 PROCEDURE — 160002 HCHG RECOVERY MINUTES (STAT): Performed by: ORTHOPAEDIC SURGERY

## 2023-03-08 PROCEDURE — 160035 HCHG PACU - 1ST 60 MINS PHASE I: Performed by: ORTHOPAEDIC SURGERY

## 2023-03-08 PROCEDURE — 80048 BASIC METABOLIC PNL TOTAL CA: CPT

## 2023-03-08 PROCEDURE — 700101 HCHG RX REV CODE 250: Performed by: ANESTHESIOLOGY

## 2023-03-08 PROCEDURE — 160029 HCHG SURGERY MINUTES - 1ST 30 MINS LEVEL 4: Performed by: ORTHOPAEDIC SURGERY

## 2023-03-08 PROCEDURE — 770020 HCHG ROOM/CARE - TELE (206)

## 2023-03-08 PROCEDURE — 01480 ANES OPEN PX LOWER L/A/F NOS: CPT | Performed by: ANESTHESIOLOGY

## 2023-03-08 PROCEDURE — 99232 SBSQ HOSP IP/OBS MODERATE 35: CPT | Mod: GC | Performed by: HOSPITALIST

## 2023-03-08 PROCEDURE — 700102 HCHG RX REV CODE 250 W/ 637 OVERRIDE(OP): Performed by: ANESTHESIOLOGY

## 2023-03-08 PROCEDURE — 85027 COMPLETE CBC AUTOMATED: CPT

## 2023-03-08 PROCEDURE — 27829 TREAT LOWER LEG JOINT: CPT | Mod: LT | Performed by: ORTHOPAEDIC SURGERY

## 2023-03-08 PROCEDURE — 700111 HCHG RX REV CODE 636 W/ 250 OVERRIDE (IP): Performed by: ORTHOPAEDIC SURGERY

## 2023-03-08 PROCEDURE — 700102 HCHG RX REV CODE 250 W/ 637 OVERRIDE(OP): Performed by: HOSPITALIST

## 2023-03-08 PROCEDURE — A9270 NON-COVERED ITEM OR SERVICE: HCPCS | Performed by: HOSPITALIST

## 2023-03-08 DEVICE — WIRE 1.25MMX150MM K-WIRE (10 PACK) (6TX10=60): Type: IMPLANTABLE DEVICE | Site: LEG | Status: FUNCTIONAL

## 2023-03-08 DEVICE — SCREW 3.5 MM NON-LOCKING TI X 50MM LONG (6TX8=48): Type: IMPLANTABLE DEVICE | Site: LEG | Status: FUNCTIONAL

## 2023-03-08 DEVICE — SCREW 2.5 MM LOCKING TI X 10MM LONG (6TX8=48): Type: IMPLANTABLE DEVICE | Site: LEG | Status: FUNCTIONAL

## 2023-03-08 DEVICE — IMPLANTABLE DEVICE: Type: IMPLANTABLE DEVICE | Site: LEG | Status: FUNCTIONAL

## 2023-03-08 DEVICE — PLATE DISTAL FIBULA 7H (2TX2+2TX1=6): Type: IMPLANTABLE DEVICE | Site: LEG | Status: FUNCTIONAL

## 2023-03-08 DEVICE — SCREW 3.5 MM NON-LOCKING TI X 10MM LONG (6TX8+2TX5=58): Type: IMPLANTABLE DEVICE | Site: LEG | Status: FUNCTIONAL

## 2023-03-08 DEVICE — SCREW 2.5 MM LOCKING TI X 12MM LONG (6TX8=48): Type: IMPLANTABLE DEVICE | Site: LEG | Status: FUNCTIONAL

## 2023-03-08 DEVICE — SCREW 3.5 MM NON-LOCKING TI X 12MM LONG (6TX8+2TX5=58): Type: IMPLANTABLE DEVICE | Site: LEG | Status: FUNCTIONAL

## 2023-03-08 DEVICE — SCREW 2.5 MM LOCKING TI X 14MM LONG (6TX8=48): Type: IMPLANTABLE DEVICE | Site: LEG | Status: FUNCTIONAL

## 2023-03-08 RX ORDER — KETOROLAC TROMETHAMINE 30 MG/ML
INJECTION, SOLUTION INTRAMUSCULAR; INTRAVENOUS PRN
Status: DISCONTINUED | OUTPATIENT
Start: 2023-03-08 | End: 2023-03-08 | Stop reason: SURG

## 2023-03-08 RX ORDER — ONDANSETRON 2 MG/ML
INJECTION INTRAMUSCULAR; INTRAVENOUS PRN
Status: DISCONTINUED | OUTPATIENT
Start: 2023-03-08 | End: 2023-03-08 | Stop reason: SURG

## 2023-03-08 RX ORDER — ENOXAPARIN SODIUM 100 MG/ML
40 INJECTION SUBCUTANEOUS DAILY
Status: DISCONTINUED | OUTPATIENT
Start: 2023-03-08 | End: 2023-03-10 | Stop reason: HOSPADM

## 2023-03-08 RX ORDER — MIDAZOLAM HYDROCHLORIDE 1 MG/ML
1 INJECTION INTRAMUSCULAR; INTRAVENOUS
Status: DISCONTINUED | OUTPATIENT
Start: 2023-03-08 | End: 2023-03-08 | Stop reason: HOSPADM

## 2023-03-08 RX ORDER — LABETALOL HYDROCHLORIDE 5 MG/ML
5 INJECTION, SOLUTION INTRAVENOUS
Status: DISCONTINUED | OUTPATIENT
Start: 2023-03-08 | End: 2023-03-08 | Stop reason: HOSPADM

## 2023-03-08 RX ORDER — PHENYLEPHRINE HCL IN 0.9% NACL 0.5 MG/5ML
SYRINGE (ML) INTRAVENOUS PRN
Status: DISCONTINUED | OUTPATIENT
Start: 2023-03-08 | End: 2023-03-08 | Stop reason: SURG

## 2023-03-08 RX ORDER — LOSARTAN POTASSIUM 50 MG/1
50 TABLET ORAL
Status: DISCONTINUED | OUTPATIENT
Start: 2023-03-08 | End: 2023-03-09

## 2023-03-08 RX ORDER — HYDROMORPHONE HYDROCHLORIDE 1 MG/ML
0.1 INJECTION, SOLUTION INTRAMUSCULAR; INTRAVENOUS; SUBCUTANEOUS
Status: DISCONTINUED | OUTPATIENT
Start: 2023-03-08 | End: 2023-03-08 | Stop reason: HOSPADM

## 2023-03-08 RX ORDER — HYDROMORPHONE HYDROCHLORIDE 1 MG/ML
0.4 INJECTION, SOLUTION INTRAMUSCULAR; INTRAVENOUS; SUBCUTANEOUS
Status: DISCONTINUED | OUTPATIENT
Start: 2023-03-08 | End: 2023-03-08 | Stop reason: HOSPADM

## 2023-03-08 RX ORDER — BUPIVACAINE HYDROCHLORIDE AND EPINEPHRINE 5; 5 MG/ML; UG/ML
INJECTION, SOLUTION EPIDURAL; INTRACAUDAL; PERINEURAL
Status: DISCONTINUED | OUTPATIENT
Start: 2023-03-08 | End: 2023-03-08 | Stop reason: HOSPADM

## 2023-03-08 RX ORDER — OXYCODONE HCL 5 MG/5 ML
10 SOLUTION, ORAL ORAL
Status: COMPLETED | OUTPATIENT
Start: 2023-03-08 | End: 2023-03-08

## 2023-03-08 RX ORDER — ONDANSETRON 2 MG/ML
4 INJECTION INTRAMUSCULAR; INTRAVENOUS
Status: DISCONTINUED | OUTPATIENT
Start: 2023-03-08 | End: 2023-03-08 | Stop reason: HOSPADM

## 2023-03-08 RX ORDER — LIDOCAINE HYDROCHLORIDE 20 MG/ML
INJECTION, SOLUTION EPIDURAL; INFILTRATION; INTRACAUDAL; PERINEURAL PRN
Status: DISCONTINUED | OUTPATIENT
Start: 2023-03-08 | End: 2023-03-08 | Stop reason: SURG

## 2023-03-08 RX ORDER — OXYCODONE HCL 5 MG/5 ML
5 SOLUTION, ORAL ORAL
Status: COMPLETED | OUTPATIENT
Start: 2023-03-08 | End: 2023-03-08

## 2023-03-08 RX ORDER — DEXAMETHASONE SODIUM PHOSPHATE 4 MG/ML
INJECTION, SOLUTION INTRA-ARTICULAR; INTRALESIONAL; INTRAMUSCULAR; INTRAVENOUS; SOFT TISSUE PRN
Status: DISCONTINUED | OUTPATIENT
Start: 2023-03-08 | End: 2023-03-08 | Stop reason: SURG

## 2023-03-08 RX ORDER — METOPROLOL TARTRATE 25 MG/1
25 TABLET, FILM COATED ORAL EVERY 6 HOURS
Status: DISCONTINUED | OUTPATIENT
Start: 2023-03-08 | End: 2023-03-09

## 2023-03-08 RX ORDER — HYDROMORPHONE HYDROCHLORIDE 1 MG/ML
0.2 INJECTION, SOLUTION INTRAMUSCULAR; INTRAVENOUS; SUBCUTANEOUS
Status: DISCONTINUED | OUTPATIENT
Start: 2023-03-08 | End: 2023-03-08 | Stop reason: HOSPADM

## 2023-03-08 RX ORDER — METOPROLOL TARTRATE 25 MG/1
25 TABLET, FILM COATED ORAL EVERY 6 HOURS PRN
Status: DISCONTINUED | OUTPATIENT
Start: 2023-03-08 | End: 2023-03-08

## 2023-03-08 RX ORDER — IPRATROPIUM BROMIDE AND ALBUTEROL SULFATE 2.5; .5 MG/3ML; MG/3ML
3 SOLUTION RESPIRATORY (INHALATION)
Status: DISCONTINUED | OUTPATIENT
Start: 2023-03-08 | End: 2023-03-08 | Stop reason: HOSPADM

## 2023-03-08 RX ORDER — CEFAZOLIN SODIUM 1 G/3ML
INJECTION, POWDER, FOR SOLUTION INTRAMUSCULAR; INTRAVENOUS PRN
Status: DISCONTINUED | OUTPATIENT
Start: 2023-03-08 | End: 2023-03-08 | Stop reason: SURG

## 2023-03-08 RX ORDER — HYDRALAZINE HYDROCHLORIDE 20 MG/ML
5 INJECTION INTRAMUSCULAR; INTRAVENOUS
Status: DISCONTINUED | OUTPATIENT
Start: 2023-03-08 | End: 2023-03-08 | Stop reason: HOSPADM

## 2023-03-08 RX ORDER — HALOPERIDOL 5 MG/ML
1 INJECTION INTRAMUSCULAR
Status: DISCONTINUED | OUTPATIENT
Start: 2023-03-08 | End: 2023-03-08 | Stop reason: HOSPADM

## 2023-03-08 RX ORDER — SODIUM CHLORIDE, SODIUM LACTATE, POTASSIUM CHLORIDE, CALCIUM CHLORIDE 600; 310; 30; 20 MG/100ML; MG/100ML; MG/100ML; MG/100ML
INJECTION, SOLUTION INTRAVENOUS CONTINUOUS
Status: DISCONTINUED | OUTPATIENT
Start: 2023-03-08 | End: 2023-03-08 | Stop reason: HOSPADM

## 2023-03-08 RX ORDER — METOPROLOL TARTRATE 25 MG/1
25 TABLET, FILM COATED ORAL TWICE DAILY
Status: DISCONTINUED | OUTPATIENT
Start: 2023-03-08 | End: 2023-03-08

## 2023-03-08 RX ADMIN — DEXAMETHASONE SODIUM PHOSPHATE 4 MG: 4 INJECTION, SOLUTION INTRA-ARTICULAR; INTRALESIONAL; INTRAMUSCULAR; INTRAVENOUS; SOFT TISSUE at 09:14

## 2023-03-08 RX ADMIN — KETOROLAC TROMETHAMINE 15 MG: 30 INJECTION, SOLUTION INTRAMUSCULAR at 09:49

## 2023-03-08 RX ADMIN — ACETAMINOPHEN 650 MG: 325 TABLET, FILM COATED ORAL at 20:17

## 2023-03-08 RX ADMIN — FENTANYL CITRATE 25 MCG: 50 INJECTION, SOLUTION INTRAMUSCULAR; INTRAVENOUS at 10:25

## 2023-03-08 RX ADMIN — AMLODIPINE BESYLATE 10 MG: 5 TABLET ORAL at 05:50

## 2023-03-08 RX ADMIN — SENNOSIDES AND DOCUSATE SODIUM 2 TABLET: 50; 8.6 TABLET ORAL at 05:50

## 2023-03-08 RX ADMIN — CEFAZOLIN 2 G: 2 INJECTION, POWDER, FOR SOLUTION INTRAMUSCULAR; INTRAVENOUS at 17:19

## 2023-03-08 RX ADMIN — LIDOCAINE HYDROCHLORIDE 100 MG: 20 INJECTION, SOLUTION EPIDURAL; INFILTRATION; INTRACAUDAL at 09:06

## 2023-03-08 RX ADMIN — FENTANYL CITRATE 25 MCG: 50 INJECTION, SOLUTION INTRAMUSCULAR; INTRAVENOUS at 10:35

## 2023-03-08 RX ADMIN — FENTANYL CITRATE 50 MCG: 50 INJECTION, SOLUTION INTRAMUSCULAR; INTRAVENOUS at 09:28

## 2023-03-08 RX ADMIN — OXYCODONE HYDROCHLORIDE 10 MG: 5 SOLUTION ORAL at 10:25

## 2023-03-08 RX ADMIN — FENTANYL CITRATE 50 MCG: 50 INJECTION, SOLUTION INTRAMUSCULAR; INTRAVENOUS at 09:20

## 2023-03-08 RX ADMIN — PROPOFOL 30 MG: 10 INJECTION, EMULSION INTRAVENOUS at 09:08

## 2023-03-08 RX ADMIN — FENTANYL CITRATE 25 MCG: 50 INJECTION, SOLUTION INTRAMUSCULAR; INTRAVENOUS at 10:14

## 2023-03-08 RX ADMIN — SENNOSIDES AND DOCUSATE SODIUM 2 TABLET: 50; 8.6 TABLET ORAL at 17:14

## 2023-03-08 RX ADMIN — ONDANSETRON 4 MG: 2 INJECTION INTRAMUSCULAR; INTRAVENOUS at 09:14

## 2023-03-08 RX ADMIN — ENOXAPARIN SODIUM 40 MG: 40 INJECTION SUBCUTANEOUS at 17:14

## 2023-03-08 RX ADMIN — NICOTINE 14 MG: 14 PATCH, EXTENDED RELEASE TRANSDERMAL at 05:50

## 2023-03-08 RX ADMIN — Medication 100 MCG: at 09:06

## 2023-03-08 RX ADMIN — PROPOFOL 50 MG: 10 INJECTION, EMULSION INTRAVENOUS at 09:06

## 2023-03-08 RX ADMIN — METOPROLOL SUCCINATE 100 MG: 25 TABLET, EXTENDED RELEASE ORAL at 05:49

## 2023-03-08 RX ADMIN — CEFAZOLIN 2 G: 330 INJECTION, POWDER, FOR SOLUTION INTRAMUSCULAR; INTRAVENOUS at 09:15

## 2023-03-08 ASSESSMENT — COGNITIVE AND FUNCTIONAL STATUS - GENERAL
SUGGESTED CMS G CODE MODIFIER MOBILITY: CL
MOVING TO AND FROM BED TO CHAIR: A LOT
PERSONAL GROOMING: A LITTLE
WALKING IN HOSPITAL ROOM: A LOT
EATING MEALS: A LITTLE
MOVING FROM LYING ON BACK TO SITTING ON SIDE OF FLAT BED: A LOT
STANDING UP FROM CHAIR USING ARMS: A LITTLE
CLIMB 3 TO 5 STEPS WITH RAILING: TOTAL
DRESSING REGULAR LOWER BODY CLOTHING: A LITTLE
DAILY ACTIVITIY SCORE: 16
HELP NEEDED FOR BATHING: A LOT
MOBILITY SCORE: 14
SUGGESTED CMS G CODE MODIFIER DAILY ACTIVITY: CK
TOILETING: A LOT
DRESSING REGULAR UPPER BODY CLOTHING: A LITTLE

## 2023-03-08 ASSESSMENT — PAIN DESCRIPTION - PAIN TYPE
TYPE: SURGICAL PAIN
TYPE: ACUTE PAIN
TYPE: SURGICAL PAIN
TYPE: ACUTE PAIN;CHRONIC PAIN

## 2023-03-08 ASSESSMENT — PAIN SCALES - GENERAL: PAIN_LEVEL: 0

## 2023-03-08 NOTE — DISCHARGE PLANNING
Case Management Discharge Planning    Admission Date: 3/7/2023  GMLOS: 2.7  ALOS: 1    TRANSFER BACK PATIENT    Referring Facility: [facility]   Reason for Transfer: Ankle Fracture, needs ortho consult and cardiac clearance    Signed Repatriation/Transfer Back Agreement saved in .    Once this patient has received the requested service or the patient no longer requires tertiary level of care from [facility] and is stable to transfer back to referring facility, we can facilitate a transfer back. Please contact the [facility] at [phone] to coordinate this transfer request.

## 2023-03-08 NOTE — ANESTHESIA POSTPROCEDURE EVALUATION
Patient: Susan Garcia    Procedure Summary     Date: 03/08/23 Room / Location: [place] [id number] / SURGERY [facility]    Anesthesia Start: 0906 Anesthesia Stop: 1012    Procedure: ORIF, ANKLE (Left: Leg Lower) Diagnosis: (left trimalleolar ankle fracture)    Surgeons: Landon Bucio M.D. Responsible Provider: Adams Thornton M.D.    Anesthesia Type: general ASA Status: 4 - Emergent          Final Anesthesia Type: general  Last vitals  BP   Blood Pressure : 123/59    Temp   36.7 °C (98 °F)    Pulse   (!) 56   Resp   15    SpO2   98 %      Anesthesia Post Evaluation    Patient location during evaluation: PACU  Patient participation: complete - patient participated  Level of consciousness: awake and alert  Pain score: 0    Airway patency: patent  Anesthetic complications: no  Cardiovascular status: hemodynamically stable  Respiratory status: acceptable  Hydration status: euvolemic    PONV: none          No notable events documented.     Nurse Pain Score: 5 (NPRS)

## 2023-03-08 NOTE — ANESTHESIA TIME REPORT
Anesthesia Start and Stop Event Times     Date Time Event    3/8/2023 0858 Ready for Procedure     0906 Anesthesia Start     1012 Anesthesia Stop        Responsible Staff  03/08/23    Name Role Begin End    Adams Thornton M.D. Anesth 0906 1012        Overtime Reason:  no overtime (within assigned shift)    Comments:

## 2023-03-08 NOTE — CARE PLAN
Problem: Skin Integrity  Goal: Skin integrity is maintained or improved  Outcome: Progressing pt able and encouraged to turn in bed every 2 hours      Problem: Fall Risk  Goal: Patient will remain free from falls  Outcome: Progressing call light within reach, able to call    The patient is Stable - Low risk of patient condition declining or worsening    Shift Goals  Clinical Goals: have BM Comfort and rest  Patient Goals: pain control and get surgery done  Family Goals: FINA    Progress made toward(s) clinical / shift goals:  Assumed care of patient at 0715. Received bedside report from night shift RN. Bed is locked and lowest position, call light within reach. Treaded socks in place. Patient updated on plan of care, no complaints or pain at this time. White board updated. Pt AxOx4 Patient breathing pattern is unlabored. Pt is tele. NPO after midnight for surgery today. )744 picj up time for OR.  All needs met at this time. Will continue care and monitoring as ordered.

## 2023-03-08 NOTE — PROGRESS NOTES
Paged to evaluate this patient for surgical planning / periop evaluation in the setting of a recent TTE reporting aortic stenosis. Reports of CABG and AVR.    Personally reviewed images of TTE 3/5/23 at [facility]:  -Preserved biventricular systolic functions  - bioprosthetic AV leaflets with good opening, mild AI, no PVL: Vmax 3-4 ms, DVI 0.52, normal jet contour , AT < 100ms --> most likely high flow with normal functioning prosthesis given normal valve opening without evident restriction  -Pulmonary hypertension RVSP 40-45mmHg  -Dilated Ascending Aorta 4cm  -Normal IVC size  -No pericardial effusion  -MAC without significant mitral regurg or stenosis      Addendum:    Patient got her surgery, tolerated it well.    Please call Cardiology if any urgent CV needs are needed. Her bioprosthetic valve is functioning well. She can follow up with her OP Cardiology team.

## 2023-03-08 NOTE — ANESTHESIA PROCEDURE NOTES
Airway    Date/Time: 3/8/2023 9:11 AM  Performed by: Adams Thornton M.D.  Authorized by: Adams Thornton M.D.     Location:  OR  Urgency:  Elective  Indications for Airway Management:  Anesthesia      Spontaneous Ventilation: absent    Sedation Level:  Deep  Preoxygenated: Yes    Mask Difficulty Assessment:  0 - not attempted  Final Airway Type:  Supraglottic airway  Final Supraglottic Airway:  Standard LMA    SGA Size:  3  Number of Attempts at Approach:  1

## 2023-03-08 NOTE — CARE PLAN
The patient is Stable - Low risk of patient condition declining or worsening    Shift Goals  Clinical Goals: have BM, comfort, pain control  Patient Goals: pain control, get moving  Family Goals: FINA    Progress made toward(s) clinical / shift goals:    Problem: Skin Integrity  Goal: Skin integrity is maintained or improved  Outcome: Progressing  Note: Waffle overlay placed on bed for optimal skin integrity maintenance.      Problem: Pain - Standard  Goal: Alleviation of pain or a reduction in pain to the patient’s comfort goal  Outcome: Progressing  Note: Pain well managed with tylenol.        Patient is not progressing towards the following goals:

## 2023-03-08 NOTE — PROGRESS NOTES
Monitor Summary    Rhythm:  NSR  Rate: 53-78  Ectopy: rPVC  Measurement: 0.18/0.06/0.43

## 2023-03-08 NOTE — OP REPORT
DATE OF OPERATION: 3/8/2023     PREOPERATIVE DIAGNOSIS: Left trimalleolar ankle fracture, osteopenic bone, pressure injury to left heel, previous CVA    POSTOPERATIVE DIAGNOSIS: Same    PROCEDURE PERFORMED: Open treatment of left trimalleolar ankle fracture without fixation of posterior lip, open reduction internal fixation of syndesmotic injury left ankle    SURGEON: Landon Bucio M.D.     ASSISTANT: Adams Bowen MD     ANESTHESIA: General    SPECIMEN: None    ESTIMATED BLOOD LOSS: 10 mL    IMPLANTS: OIC lateral fibular locking plate and screws, OIC 4.0 mm cannulated screws      INDICATIONS: The patient is a 83 y.o. female who presented with a left ankle fracture that occurred after ground-level fall.  I recommended open reduction internal fixation to reestablish a stable ankle mortise to allow for earlier range of motion and weightbearing.  I discussed the risks and benefits of the procedure which include but are not limited to risks of infection, wound healing complication, neurovascular injury, pain, malunion, non-union, malrotation, and the medical risks of anesthesia including MI, stroke, and death.  Alternatives to surgery were also discussed, including non-operative management, which I did not recommend.  The patient was in agreement with the plan to proceed, and the informed consent was signed and documented.  I met with the patient pre-operatively and marked the operative extremity with their agreement.  We proceeded to the operating room.     DESCRIPTION OF PROCEDURE:  Patient was seen in the preoperative holding area on the day of surgery. The operative site was marked with my initials.  she was taken to the operating room and placed supine on the operative table.  Anesthesia was induced.  The operative extremity was prepped and draped in the normal sterile fashion.  Operative pause was conducted and the correct patient, site, side, procedure, and surgeon's initials on extremity were  identified.  Initial closed reduction was done to the ankle which showed near-anatomic alignment through this manipulation alone.  Because of this a percutaneous method of fixation was chosen.  A small incision was made centered over the fibular metaphysis.  Through this incision a lateral fibular locking plate was slid along the surface of the bone submuscularly.  This was fitted to her anatomy.  This was aligned on AP and lateral views.  This was secured to the metaphysis with locking screws.  Dental pick was then used to pull traction and restore length to the fibula.  More proximally 3 separate poke hole incisions were used for proximal screw placement.  Nonlocking screws were used proximally to further compress and reduce the fracture site and secured the plate to the bone.  There is a large posterior malleolus fragment concerning for syndesmotic injury.  A stress examination of the left ankle showed continued widening of the tibiofibular overlap.  A Quadra cortical syndesmotic screw was then placed while the ankle was held in reduced position with a hand clamp technique.  Attention was then turned to the medial malleolus.  A percutaneous pin was placed to the tip of the fracture.  A partially-threaded screw was then advanced over this pin.  This further compress and maintain the reduction of the medial malleolus.  This created a symmetric ankle mortise without any further subluxation of the talus.  Final fluoroscopic images were obtained.  The posterior malleolus did not require additional fixation.  There is no subluxation with posterior directed force.  The wounds were irrigated and closed with 3-0 Vicryl and 3-0 nylon.  Sterile dressings were applied and she was placed into a cam boot.  At this point patient awoke in the operating room and was taken to PACU in stable condition.    POSTOPERATIVE PLAN: Weightbearing as tolerated to the left lower extremity for transfers.  Elevate the leg at rest for swelling  and pain relief.  Float the heel to avoid additional heel pressure injury.  Wound care consult for heel pressure injury and anterior ankle pressure injury likely from previous splinting.  Mobilize with physical and occupational therapies.  DVT prophylaxis with SCDs and Lovenox until mobilizing independently and then can be switched to aspirin for 4 weeks.  The patient will follow up in clinic in 2 weeks to check wounds and remove sutures/staples.  With further wound healing she can weight-bear and activity as tolerated in the boot      ____________________________________   Landon Bucio M.D.   DD: 3/8/2023  10:04 AM

## 2023-03-08 NOTE — ASSESSMENT & PLAN NOTE
Pain control with Tylenol    Orthopedic surgery on 3/8/23 of Open treatment of left trimalleolar ankle fracture without fixation of posterior lip, open reduction internal fixation of syndesmotic injury left ankle  Continue DVT ppx with lovenox   Orthopedic recommendations: Weightbearing as tolerated to the left lower extremity for transfers;  Elevate the leg at rest for swelling and pain relief; Float the heel to avoid additional heel pressure injury; Wound care consult for heel pressure injury and anterior ankle pressure injury likely from previous splinting.    Mobilize with physical and occupational therapies.    DVT prophylaxis with SCDs and Lovenox until mobilizing independently and then can be switched to aspirin for 4 weeks.    The patient will follow up in clinic in 2 weeks to check wounds and remove sutures/staples.    With further wound healing she can weight-bear and activity as tolerated in the boot

## 2023-03-08 NOTE — OR NURSING
Patient recovered well post op. A&Ox4. VSS, 2L nc. Surgical sites CDI. Surgical pain managed. Patient able to drink fluids without Nausea and vomiting. PT belongings in her room. Spouse updated and discussed plan of care. Report called to Benitez STEVENS.

## 2023-03-08 NOTE — CONSULTS
3/7/2023    Time Called: 1600  Time Arrived: 1745      HPI: Susan Garcia is a 83 y.o. female who presents with reported left ankle fracture although there is no available images to review.  She reportedly sustained a ground-level fall yesterday.  She was seen at outside facility where surgery was planned.  She underwent closed reduction and splinting at the outside facility.  She was then noted to have significant cardiac history in the anesthesiologist at the outside facility did not feel comfortable.  She was transferred here for preoperative clearance.  Of note orthopedic surgery was not consulted prior to transfer.    Past Medical History:   Diagnosis Date    Heart murmur     Heart valve disease     Hypertension     Stroke (HCC)     Previous CVA.       Past Surgical History:   Procedure Laterality Date    OTHER CARDIAC SURGERY         Medications  No current facility-administered medications on file prior to encounter.     Current Outpatient Medications on File Prior to Encounter   Medication Sig Dispense Refill    losartan (COZAAR) 100 MG Tab Take 100 mg by mouth every day.      amLODIPine (NORVASC) 10 MG Tab Take 10 mg by mouth every day.      Cholecalciferol (D3) 2000 UNIT Tab Take 2,000 Units by mouth every day.      clopidogrel (PLAVIX) 75 MG Tab Take 1 Tab by mouth every day. 30 Tab 0    coenzyme Q-10 100 MG Cap capsule Take 1 Cap by mouth every day. 30 Cap 3    Multiple Vitamins-Minerals (EYE VITAMINS & MINERALS) Tab Take 1 Tab by mouth every morning.      metoprolol SR (TOPROL XL) 100 MG TABLET SR 24 HR Take 100 mg by mouth every morning.         Allergies  Patient has no known allergies.    ROS  . All other systems were reviewed and found to be negative    History reviewed. No pertinent family history.    Social History     Socioeconomic History    Marital status:    Tobacco Use    Smoking status: Former     Types: Cigarettes    Smokeless tobacco: Never   Substance and Sexual Activity     Alcohol use: No    Drug use: No       Physical Exam  Vitals  /65   Pulse 62   Temp 36.3 °C (97.3 °F) (Temporal)   Resp 16   Wt 56.5 kg (124 lb 9 oz)   SpO2 98%   General: Well Developed, Well Nourished, Age appropriate appearance  HEENT: Normocephalic, atraumatic  Psych: Normal mood and affect  Neck: Supple, nontender, no masses  Lungs: Breathing unlabored, No audible wheezing  Heart: Regular heart rate and rhythm  Abdomen: Soft, NT, ND  Neuro: Sensation grossly intact to BUE and BLE, moving all four extremities  Skin: Intact, no open wounds  Vascular: Foot is warm well perfused, Capillary refill <2 seconds  MSK: Short leg splint in place.  Sensation intact distally.      Radiographs:  DX-ANKLE 3+ VIEWS LEFT    (Results Pending)       Laboratory Values  Recent Labs     03/07/23  1443   WBC 12.0*   RBC 3.42*   HEMOGLOBIN 10.6*   HEMATOCRIT 30.6*   MCV 89.5   MCH 31.0   MCHC 34.6   RDW 43.2   PLATELETCT 230   MPV 9.4     Recent Labs     03/07/23  1443   SODIUM 139   POTASSIUM 4.2   CHLORIDE 107   CO2 21   GLUCOSE 115*   BUN 12     Recent Labs     03/07/23  1443   INR 1.04         Impression: 83-year-old female with reported trimalleolar ankle fracture on the left status post closed reduction and splinting.  No available images to review at this point time.  I ordered stat images to review prior to potential surgery.  Please make n.p.o. at midnight for possible surgery tomorrow.    We discussed that this transfer was larger inappropriate as this may likely be appropriate for outpatient procedure.  However we were consulted after admission/transfer.  We will do our best to arrange surgical fixation for tomorrow.  We will follow-up ankle x-rays as well.    Plan:We discussed the diagnosis and findings with the patient at length.  We reviewed possible non operative and operative interventions and the risks and benefits of each of these.  she had a chance to ask questions and all of these were answered to her  satisfaction. The patient chose to proceed with surgical intervention. Risks and benefits of surgery were discussed which include but are not limited to bleeding, infection, neurovascular damage, malunion, nonunion, instability, limb length discrepancy, DVT, PE, MI, Stroke and death. They understand these risks and wish to proceed.      Anirudh Lynne MD  Orthopedic Trauma Surgery

## 2023-03-08 NOTE — ASSESSMENT & PLAN NOTE
Currently controlled    Continue Cozaar and Toprol XL at half home dose; resumed amlodipine at half dose today; continue to hold hydrochlorothiazide    Patient is to followup with her outpatient cardiologist upon discharge

## 2023-03-08 NOTE — ASSESSMENT & PLAN NOTE
The valvular diameter at this time 0.7 characterized the stenosis to moderate to severe    Cardiology will comment on recommendations prior to possible surgery    Continue beta-blocker at half of home dose    Control BP

## 2023-03-08 NOTE — ANESTHESIA PREPROCEDURE EVALUATION
" Case: [id number] Date/Time: 03/08/23 0845    Procedure: ORIF, ANKLE (Left)    Location: [place] [address] / SURGERY [facility]    Surgeons: Landon Bucio M.D.      Left ankle fracture. Transferred from [facility] due to complex cardiac history.     CABG with AVR. Murmur noted at outside hospital and echo performed. Concern for severe aortic stenosis per outside reports in setting of prosthetic aortic valve. However review by our interventional cardiologist as follows:     \"-Preserved biventricular systolic functions  - bioprosthetic AV leaflets with good opening, mild AI, no PVL: Vmax 3-4 ms, DVI 0.52, normal jet contour , AT < 100ms --> most likely high flow with normal functioning prosthesis given normal valve opening without evident restriction  -Pulmonary hypertension RVSP 40-45mmHg  -Dilated Ascending Aorta 4cm  -Normal IVC size  -No pericardial effusion  -MAC without significant mitral regurg or stenosis\"    Patient denies angina, syncope.     METs >4 at home per patient.     Stroke/TIA x 2 without residual effects, aside from small loss of vision in left eye.     NPO.     Relevant Problems   NEURO   (positive) TIA (transient ischemic attack)      CARDIAC   (positive) Aortic valve stenosis, severe   (positive) Hypertensive urgency   (positive) Primary hypertension       Physical Exam    Airway   Mallampati: II  TM distance: >3 FB  Neck ROM: full       Cardiovascular - normal exam  Rhythm: regular  Rate: normal  (-) murmur     Dental - normal exam  (+) upper dentures, lower dentures           Pulmonary - normal exam  Breath sounds clear to auscultation     Abdominal    Neurological - normal exam               Anesthesia Plan    ASA 4- EMERGENT   ASA physical status 4 criteria: severe valve dysfunctionASA physical status emergent criteria: displaced fracture with possible neurovascular compromise    Plan - general       Airway plan will be LMA          Induction: intravenous    Postoperative Plan: Postoperative " administration of opioids is intended.    Pertinent diagnostic labs and testing reviewed    Informed Consent:    Anesthetic plan and risks discussed with patient.    Use of blood products discussed with: patient whom consented to blood products.

## 2023-03-08 NOTE — THERAPY
Physical Therapy Contact Note    Patient Name: Susan Garcia  Age:  83 y.o., Sex:  female  Medical Record #: [id number]  Today's Date: 3/8/2023       03/08/23 0734   Interdisciplinary Plan of Care Collaboration   Collaboration Comments PT consult received.  Per chart review pt scheduled for surgery today and has strict bed rest orders in place.  Will HOLD and follow up post op as able/appropriate when pt cleared for OOB mobility.

## 2023-03-09 PROCEDURE — 700111 HCHG RX REV CODE 636 W/ 250 OVERRIDE (IP): Performed by: ORTHOPAEDIC SURGERY

## 2023-03-09 PROCEDURE — 99232 SBSQ HOSP IP/OBS MODERATE 35: CPT | Mod: GC | Performed by: HOSPITALIST

## 2023-03-09 PROCEDURE — A9270 NON-COVERED ITEM OR SERVICE: HCPCS | Performed by: HOSPITALIST

## 2023-03-09 PROCEDURE — 700102 HCHG RX REV CODE 250 W/ 637 OVERRIDE(OP): Performed by: HOSPITALIST

## 2023-03-09 PROCEDURE — 700105 HCHG RX REV CODE 258: Performed by: ORTHOPAEDIC SURGERY

## 2023-03-09 PROCEDURE — 700111 HCHG RX REV CODE 636 W/ 250 OVERRIDE (IP)

## 2023-03-09 PROCEDURE — 770001 HCHG ROOM/CARE - MED/SURG/GYN PRIV*

## 2023-03-09 PROCEDURE — 700102 HCHG RX REV CODE 250 W/ 637 OVERRIDE(OP): Performed by: STUDENT IN AN ORGANIZED HEALTH CARE EDUCATION/TRAINING PROGRAM

## 2023-03-09 PROCEDURE — A9270 NON-COVERED ITEM OR SERVICE: HCPCS | Performed by: STUDENT IN AN ORGANIZED HEALTH CARE EDUCATION/TRAINING PROGRAM

## 2023-03-09 PROCEDURE — 97162 PT EVAL MOD COMPLEX 30 MIN: CPT

## 2023-03-09 PROCEDURE — 97530 THERAPEUTIC ACTIVITIES: CPT

## 2023-03-09 PROCEDURE — 97535 SELF CARE MNGMENT TRAINING: CPT

## 2023-03-09 RX ORDER — LOSARTAN POTASSIUM 50 MG/1
50 TABLET ORAL
Status: DISCONTINUED | OUTPATIENT
Start: 2023-03-09 | End: 2023-03-10 | Stop reason: HOSPADM

## 2023-03-09 RX ORDER — ACETAMINOPHEN 500 MG
1000 TABLET ORAL 3 TIMES DAILY
Status: DISCONTINUED | OUTPATIENT
Start: 2023-03-09 | End: 2023-03-10 | Stop reason: HOSPADM

## 2023-03-09 RX ORDER — LOSARTAN POTASSIUM 50 MG/1
25 TABLET ORAL
Status: DISCONTINUED | OUTPATIENT
Start: 2023-03-09 | End: 2023-03-09

## 2023-03-09 RX ORDER — METOPROLOL TARTRATE 25 MG/1
25 TABLET, FILM COATED ORAL TWICE DAILY
Status: DISCONTINUED | OUTPATIENT
Start: 2023-03-09 | End: 2023-03-10 | Stop reason: HOSPADM

## 2023-03-09 RX ORDER — AMLODIPINE BESYLATE 5 MG/1
5 TABLET ORAL DAILY
Status: DISCONTINUED | OUTPATIENT
Start: 2023-03-10 | End: 2023-03-09

## 2023-03-09 RX ORDER — AMLODIPINE BESYLATE 5 MG/1
5 TABLET ORAL
Status: DISCONTINUED | OUTPATIENT
Start: 2023-03-10 | End: 2023-03-09

## 2023-03-09 RX ADMIN — SENNOSIDES AND DOCUSATE SODIUM 2 TABLET: 50; 8.6 TABLET ORAL at 04:33

## 2023-03-09 RX ADMIN — CEFAZOLIN 2 G: 2 INJECTION, POWDER, FOR SOLUTION INTRAMUSCULAR; INTRAVENOUS at 02:09

## 2023-03-09 RX ADMIN — ACETAMINOPHEN 1000 MG: 500 TABLET, FILM COATED ORAL at 20:57

## 2023-03-09 RX ADMIN — ENOXAPARIN SODIUM 40 MG: 40 INJECTION SUBCUTANEOUS at 17:23

## 2023-03-09 RX ADMIN — SENNOSIDES AND DOCUSATE SODIUM 2 TABLET: 50; 8.6 TABLET ORAL at 17:23

## 2023-03-09 RX ADMIN — ACETAMINOPHEN 650 MG: 325 TABLET, FILM COATED ORAL at 07:10

## 2023-03-09 RX ADMIN — LOSARTAN POTASSIUM 50 MG: 50 TABLET, FILM COATED ORAL at 14:47

## 2023-03-09 RX ADMIN — POLYETHYLENE GLYCOL 3350 1 PACKET: 17 POWDER, FOR SOLUTION ORAL at 04:33

## 2023-03-09 RX ADMIN — AMLODIPINE BESYLATE 10 MG: 5 TABLET ORAL at 04:33

## 2023-03-09 RX ADMIN — NICOTINE 14 MG: 14 PATCH, EXTENDED RELEASE TRANSDERMAL at 04:33

## 2023-03-09 RX ADMIN — METOPROLOL TARTRATE 25 MG: 25 TABLET, FILM COATED ORAL at 14:47

## 2023-03-09 RX ADMIN — ACETAMINOPHEN 1000 MG: 500 TABLET, FILM COATED ORAL at 14:12

## 2023-03-09 ASSESSMENT — COGNITIVE AND FUNCTIONAL STATUS - GENERAL
CLIMB 3 TO 5 STEPS WITH RAILING: TOTAL
MOVING TO AND FROM BED TO CHAIR: A LITTLE
TURNING FROM BACK TO SIDE WHILE IN FLAT BAD: A LITTLE
WALKING IN HOSPITAL ROOM: TOTAL
SUGGESTED CMS G CODE MODIFIER MOBILITY: CL
STANDING UP FROM CHAIR USING ARMS: A LITTLE
MOBILITY SCORE: 12
MOVING FROM LYING ON BACK TO SITTING ON SIDE OF FLAT BED: UNABLE

## 2023-03-09 ASSESSMENT — PATIENT HEALTH QUESTIONNAIRE - PHQ9
5. POOR APPETITE OR OVEREATING: NOT AT ALL
8. MOVING OR SPEAKING SO SLOWLY THAT OTHER PEOPLE COULD HAVE NOTICED. OR THE OPPOSITE, BEING SO FIGETY OR RESTLESS THAT YOU HAVE BEEN MOVING AROUND A LOT MORE THAN USUAL: NOT AT ALL
3. TROUBLE FALLING OR STAYING ASLEEP OR SLEEPING TOO MUCH: NOT AT ALL
4. FEELING TIRED OR HAVING LITTLE ENERGY: NOT AT ALL
SUM OF ALL RESPONSES TO PHQ QUESTIONS 1-9: 0
SUM OF ALL RESPONSES TO PHQ9 QUESTIONS 1 AND 2: 0
7. TROUBLE CONCENTRATING ON THINGS, SUCH AS READING THE NEWSPAPER OR WATCHING TELEVISION: NOT AT ALL
9. THOUGHTS THAT YOU WOULD BE BETTER OFF DEAD, OR OF HURTING YOURSELF: NOT AT ALL
1. LITTLE INTEREST OR PLEASURE IN DOING THINGS: NOT AT ALL
6. FEELING BAD ABOUT YOURSELF - OR THAT YOU ARE A FAILURE OR HAVE LET YOURSELF OR YOUR FAMILY DOWN: NOT AL ALL
2. FEELING DOWN, DEPRESSED, IRRITABLE, OR HOPELESS: NOT AT ALL

## 2023-03-09 ASSESSMENT — PAIN DESCRIPTION - PAIN TYPE: TYPE: ACUTE PAIN

## 2023-03-09 NOTE — PROGRESS NOTES
Orthopaedic Progress Note    Interval changes:  Patient doing well post op  Splint CDI  Cleared for DC by ortho pending medicine clearance    ROS - Patient denies any new issues.  Pain well controlled.    /59   Pulse 70   Temp 36.5 °C (97.7 °F) (Temporal)   Resp 18   Wt 56.5 kg (124 lb 9 oz)   SpO2 92%       Patient seen and examined  No acute distress  Breathing non labored  RRR  LLE in short leg splint CDI, DNVI, moves all toes, cap refill <2 sec.       Recent Labs     03/07/23  1443 03/08/23  0128   WBC 12.0* 10.4   RBC 3.42* 3.39*   HEMOGLOBIN 10.6* 10.7*   HEMATOCRIT 30.6* 30.6*   MCV 89.5 90.3   MCH 31.0 31.6   MCHC 34.6 35.0   RDW 43.2 43.9   PLATELETCT 230 235   MPV 9.4 9.5       Active Hospital Problems    Diagnosis     Tobacco abuse [Z72.0]      Priority: Low    Aortic valve stenosis, severe [I35.0]     Closed fracture of left ankle with delayed healing [S82.892G]     Primary hypertension [I10]        Assessment/Plan:  Patient doing well post op  Splint CDI  Cleared for DC by ortho pending medicine clearance  POD#1 S/P Open treatment of left trimalleolar ankle fracture without fixation of posterior lip, open reduction internal fixation of syndesmotic injury left ankle  Wt bearing status - LLE transfers only, no ambulation.  Wound care/Drains - Splint left in place  Future Procedures - none  Lovenox: Start 3/8, Duration-until ambulatory > 150'  Sutures/Staples out- 14 days post operatively  PT/OT-initiated  Antibiotics: Perioperative completed  DVT Prophylaxis- TEDS/SCDs/Foot pumps  Combs-none  Case Coordination for Discharge Planning - Disposition home vs SNF

## 2023-03-09 NOTE — CARE PLAN
The patient is Watcher - Medium risk of patient condition declining or worsening    Shift Goals  Clinical Goals: have BM  Patient Goals: pain control, have BM  Family Goals: get better    Progress made toward(s) clinical / shift goals:    Problem: Skin Integrity  Goal: Skin integrity is maintained or improved  Outcome: Progressing  Note: Pt repositions herself frequently in the bed.      Problem: Pain - Standard  Goal: Alleviation of pain or a reduction in pain to the patient’s comfort goal  Outcome: Progressing  Note: Pain adequately controlled with current interventions in place.        Assumed care of pt @ 1900. Tylenol given for pain with subsequent relief. VSS, calls appropriately. No issue overnight.

## 2023-03-09 NOTE — CARE PLAN
The patient is Stable - Low risk of patient condition declining or worsening    Shift Goals  Clinical Goals: Pain Management  Patient Goals: Pain control  Family Goals: DC to Home/ TIF to [place]    Progress made toward(s) clinical / shift goals:    Problem: Skin Integrity  Goal: Skin integrity is maintained or improved  Outcome: Progressing  Patient is sitting in chair to relief pressure from laying down in bed. Patient declines pain and remains stable at this time      Problem: Pain - Standard  Goal: Alleviation of pain or a reduction in pain to the patient’s comfort goal  Outcome: Progressing     Patient States I will take Tylenol as needed for pain and will call my Nurse.

## 2023-03-09 NOTE — PROGRESS NOTES
[facility] Internal Medicine Daily Progress Note    Date of Service  3/8/2023    [facility] Team: [facility] IM Blue Team   Attending: Lo Bunch M.d.  Senior Resident: Dr. Marroquin  Intern:  Dr. Gibson  Contact Number: [phone]    Chief Complaint  Susan Garcia is a 83 y.o. female admitted 3/7/2023 with Left ankle pain.     Hospital Course  Susan Garcia is a 83 y.o. female who presented 3/7/2023 with past medical history  of CABG 2 vessel, stroke x2, hypertension, hyperlipidemia who sustained a fall 4 days prior to her transfer .She was seen in a [facility] and diagnosed with a trimalleolar fracture of the left ankle.  The patient was given pain management and splinted and bandaged.The plan was for patient to be taken to the operating room at that facility however due to her extensive cardiac history ,an echocardiogram was performed. The echocardiogram showed moderate aortic stenosis.  Patient was transferred to [facility] for further evaluation with cardiology prior to any operative treatment     Dr. Gavin of cardiology will review the case and give his input     Dr. Anirudh Lynne of orthopedics will review the case.    X-rays from the [facility] shows     Trimalleolar communited fracture with sublaxion.        There is also an incidental finding of a low sodium of 120 at the time of her admission at [facility].  Patient was on hydrochlorothiazide for hypertension and this was thought to be the cause.  Hydrochlorothiazide was held and it was noted that the patient's sodium was increasing however there was not a documented updated sodium level that I could find in the transfer documents.     A stat BMP was ordered-current sodium level is 139     On plavix- last taken 4 days ago    Interval Problem Update  No acute overnight events; patient was taken to the OR this morning for open treatment of left trimalleolar ankle fracture without fixation of posterior lip, open reduction internal fixation  of syndesmotic injury left ankle. Patient tolerated the procedure well. Please see OP report by Dr. Bucio for details and postoperative plan. She was started on lovenox after the procedure; awaiting for cardiology recommendations. Patient was continued on half dose of home metoprolol and losartan, amlodipine continues to be held.     I have discussed this patient's plan of care and discharge plan at IDT rounds today with Case Management, Nursing, Nursing leadership, and other members of the IDT team.    Consultants/Specialty  cardiology and orthopedics    Code Status  Full Code    Disposition  Patient is not medically cleared for discharge.   Anticipate discharge to  [facility] as per transfer back agreement .  I have placed the appropriate orders for post-discharge needs.    Review of Systems  Review of Systems   All other systems reviewed and are negative.     Physical Exam  Temp:  [36 °C (96.8 °F)-37 °C (98.6 °F)] 36.6 °C (97.8 °F)  Pulse:  [51-79] 63  Resp:  [15-18] 18  BP: (110-136)/(44-68) 135/52  SpO2:  [91 %-99 %] 93 %    Physical Exam  Vitals reviewed.   Constitutional:       Appearance: Normal appearance.   HENT:      Head: Normocephalic and atraumatic.      Mouth/Throat:      Mouth: Mucous membranes are moist.   Eyes:      Extraocular Movements: Extraocular movements intact.   Cardiovascular:      Rate and Rhythm: Normal rate and regular rhythm.      Pulses: Normal pulses.      Heart sounds: Normal heart sounds.   Pulmonary:      Effort: Pulmonary effort is normal.      Breath sounds: Normal breath sounds.   Abdominal:      General: Abdomen is flat. Bowel sounds are normal.      Palpations: Abdomen is soft.   Musculoskeletal:         General: Normal range of motion.      Cervical back: Normal range of motion.   Feet:      Comments: Left foot dressed, clean dry and intact; elevated upon bed in protective pressure off-setting boot  Skin:     General: Skin is warm and dry.   Neurological:      Mental  Status: She is alert and oriented to person, place, and time.   Psychiatric:         Mood and Affect: Mood normal.         Behavior: Behavior normal.       Fluids    Intake/Output Summary (Last 24 hours) at 3/8/2023 1649  Last data filed at 3/8/2023 0720  Gross per 24 hour   Intake 0 ml   Output 100 ml   Net -100 ml       Laboratory  Recent Labs     03/07/23  1443 03/08/23  0128   WBC 12.0* 10.4   RBC 3.42* 3.39*   HEMOGLOBIN 10.6* 10.7*   HEMATOCRIT 30.6* 30.6*   MCV 89.5 90.3   MCH 31.0 31.6   MCHC 34.6 35.0   RDW 43.2 43.9   PLATELETCT 230 235   MPV 9.4 9.5     Recent Labs     03/07/23  1443 03/08/23  0128   SODIUM 139 137   POTASSIUM 4.2 3.7   CHLORIDE 107 103   CO2 21 23   GLUCOSE 115* 101*   BUN 12 10   CREATININE 0.78 0.84   CALCIUM 8.9 8.8     Recent Labs     03/07/23  1443   INR 1.04               Imaging  DX-ANKLE 3+ VIEWS LEFT   Final Result      1.  Anatomic alignment of left distal fibula status post ORIF      DX-PORTABLE FLUORO > 1 HOUR   Final Result      Portable fluoroscopy utilized for 54 seconds.      INTERPRETING LOCATION: [address] [place] [address] [place], [address]      DX-ANKLE 3+ VIEWS LEFT   Final Result      1.  There is a trimalleolar left ankle fracture with mild widening of the medial tibiotalar joint space.           Assessment/Plan  Problem Representation:    * Closed fracture of left ankle with delayed healing- (present on admission)  Assessment & Plan  Pain control with Tylenol    Orthopedic surgery on 3/8/23 of Open treatment of left trimalleolar ankle fracture without fixation of posterior lip, open reduction internal fixation of syndesmotic injury left ankle  DVT ppx with lovenox started   Follow up on orthopedic recommendations; see OP report by Dr. Bucio for details     Primary hypertension- (present on admission)  Assessment & Plan  Currently controlled    Continue Cozaar and Toprol XL at half home dose; holding amlodipine; continue to hold hydrochlorothiazide    followup with cardiology  recommendations for heart medications     Aortic valve stenosis, severe- (present on admission)  Assessment & Plan  The valvular diameter at this time 0.7 characterized the stenosis to moderate to severe    Cardiology will comment on recommendations prior to possible surgery    Continue beta-blocker at half of home dose    Control BP    Tobacco abuse- (present on admission)  Assessment & Plan  Smoking cessation advised    Nicotine patch ordered         VTE prophylaxis: enoxaparin ppx    I have performed a physical exam and reviewed and updated ROS and Plan today (3/8/2023). In review of yesterday's note (3/7/2023), there are no changes except as documented above.

## 2023-03-09 NOTE — PROGRESS NOTES
Ongoing care no acute issues at this time. Patient had surgery today. See Op note for detail. Patient stable after surgery. Abx and anticoag given as ordered. No pain at this time. Patient resting in bed safely with no c/o anything at this time. L foot in brace. Will continue care and monitoring as ordered.

## 2023-03-09 NOTE — PROGRESS NOTES
Monitor Summary    Rhythm: NSR/NSB  Rate: 53-61  Ectopy:   Measurement: 0.18/0.06/0.41

## 2023-03-09 NOTE — HOSPITAL COURSE
Susan Garcia is a 83 y.o. female who presented 3/7/2023 with past medical history  of CABG 2 vessel, stroke x2, hypertension, hyperlipidemia who sustained a fall 4 days prior to her transfer from [facility].  She was seen at [facility] and diagnosed with a trimalleolar fracture of the left ankle.  The patient was given pain management, splinted and bandaged. The plan was for patient to be taken to the operating room at that facility however due to her extensive cardiac history, an echocardiogram was performed. The echocardiogram showed moderate aortic stenosis.  Patient was transferred to [facility] for further evaluation with cardiology prior to any operative treatment.    * Closed fracture of left ankle with delayed healing- (present on admission)  While admitted:  X-rays from the outlying facility shows Trimalleolar communited fracture with sublaxion  Pain control with Tylenol   Orthopedic surgery on 3/8/23 of Open treatment of left trimalleolar ankle fracture without fixation of posterior lip, open reduction internal fixation of syndesmotic injury left ankle  Continue DVT ppx with lovenox  Assessment & Plan  Orthopedic recommendations: Weightbearing as tolerated to the left lower extremity for transfers; Elevate the leg at rest for swelling and pain relief; Float the heel to avoid additional heel pressure injury; Wound care consult for heel pressure injury and anterior ankle pressure injury likely from previous splinting.    Mobilize with physical and occupational therapies.    DVT prophylaxis with SCDs and Lovenox until mobilizing independently and then can be switched to aspirin for 4 weeks.    The patient will follow up in clinic in 2 weeks (on 3/22/23) to check wounds and remove sutures/staples.    With further wound healing she can weight-bear and activity as tolerated in the boot.  Patient is going home with home health because she refused SNF and transfer back to  [facility] for ongoing PT/OT needs; risk of fall, fracture, head bleed, and further complications were explained to the patient and she accepted the risk.     Primary hypertension- (present on admission)  while admitted: Continued Cozaar and Toprol XL at half home dose; held amlodipine; held hydrochlorothiazide (There is also an incidental finding of a low sodium of 120 at the time of her admission at outlying facility. Patient was on hydrochlorothiazide for hypertension and this was thought to be the cause. Hydrochlorothiazide was held and it was noted that the patient's sodium was increasing;  A stat BMP was ordered-current sodium level is 139)  Assessment & Plan  Patient is to follow up with her outpatient cardiologist upon discharge.  Continue Cozaar 100mg daily and  continue metoprolol tartrate 25mg daily   Stopped amlodipine  10mg daily, hydrochlorothiazide and metoprolol SR 100mg daily      Aortic valve stenosis, severe- (present on admission)  While admitted:  The valvular diameter at this time 0.7 characterized the stenosis to moderate to severe  Evaluated by Cardiology; ok to undergo surgery  Continued beta-blocker at half of home dose  Control BP  Assessment & Plan  Patient is to follow up with her outpatient cardiologist upon discharge.     Tobacco abuse- (present on admission)  While admitted:   Smoking cessation advised  Nicotine patch ordered  Assessment & Plan  advised smoking cessation

## 2023-03-09 NOTE — THERAPY
Physical Therapy   Initial Evaluation     Patient Name: Susan Garcia  Age:  83 y.o., Sex:  female  Medical Record #: [id number]  Today's Date: 3/9/2023     Precautions  Precautions: Fall Risk;Weight Bearing As Tolerated Left Lower Extremity;CAM Boot  Comments: WB for transfers only    Assessment  Patient is 83 y.o. female transferred from outside hospital with L trimalleolar fracture after GLF.  Patient now POD # 1 open treatment of L trimalleolar ankle fracture without fixation of posterior lip, ORIF of syndesmotic injury left ankle.  PMH includes CABG x 2, stroke x 2, HTN, and HLD.  Today patient presented with impaired strength, balance, activity tolerance, and coordination/sequencing.  She required min A for STS and transfers with difficulty sequencing offloading with FWW.  Educated patient on role of acute PT, PT POC, LLE WB and use of boot, and transfer WB status.  Patient would benefit from continued OOB mobility with nursing staff including pivot to BSC and chair.  Recommend post acute placement and continued acute skilled PT.      Therapeutic activity provided after formal evaluation completed.  Patient practiced stand pivot transfer chair > EOB > chair.  Provided cues and demonstration on sequencing pivot and use of Ues on FWW for offloading.      Plan    Physical Therapy Initial Treatment Plan   Treatment Plan : Bed Mobility, Equipment, Neuro Re-Education / Balance, Self Care / Home Evaluation, Therapeutic Activities, Therapeutic Exercise  Treatment Frequency: 3 Times per Week  Duration: Until Therapy Goals Met    DC Equipment Recommendations: Wheelchair  Discharge Recommendations: Recommend post-acute placement for additional physical therapy services prior to discharge home (Pt may progress to DC home with home health pending improved safety & ind w/ transfers.)     Objective     03/09/23 1049   Initial Contact Note    Initial Contact Note Order Received and Verified, Physical Therapy Evaluation in  Progress with Full Report to Follow.   Precautions   Precautions Fall Risk;Weight Bearing As Tolerated Left Lower Extremity;CAM Boot   Comments WB for transfers only   Pain 0 - 10 Group   Therapist Pain Assessment During Activity;Nurse Notified (Not quantified)   Prior Living Situation   Prior Services Home-Independent   Housing / Facility 1 Story House   Steps Into Home 0   Bathroom Set up Walk In Shower;Shower Chair (family obtaining shower chair)   Equipment Owned 4-Wheel Walker   Lives with - Patient's Self Care Capacity Adult Children   Comments Pt lives with her daughter & son in law who work during days   Prior Level of Functional Mobility   Bed Mobility Independent   Transfer Status Independent   Ambulation Independent   Ambulation Distance community ambulator   Assistive Devices Used None   History of Falls   History of Falls Yes   Cognition    Cognition / Consciousness WDL   Comments Pleasant & cooperative   Active ROM Lower Body    Active ROM Lower Body  X   Comments WFL except L ankle NT   Strength Lower Body   Lower Body Strength  X   Comments WFL except L ankle NT   Sensation Lower Body   Lower Extremity Sensation   WDL   Comments Denied numbness/tingling   Other Treatments   Other Treatments Provided Practice with transfers & sequencing pivot & FWW after formal evaluation completed.  Please refer to evaluation note.   Balance Assessment   Sitting Balance (Static) Fair +   Sitting Balance (Dynamic) Fair   Standing Balance (Static) Poor +   Standing Balance (Dynamic) Poor   Weight Shift Sitting Fair   Weight Shift Standing Poor   Bed Mobility    Supine to Sit (NT, seated EOB pre session)   Sit to Supine (NT, up in chair post session)   Gait Analysis   Comments NT, WB for transfers only   Functional Mobility   Sit to Stand Minimal Assist   Bed, Chair, Wheelchair Transfer Minimal Assist   Transfer Method Stand Pivot   Mobility STS, transfer x3   Comments Mild difficulty sequencing pivots and UE support on  FWW, very narrow JL   Short Term Goals    Short Term Goal # 1 Pt will perform supine <> sit without bed features with SPV in 6 visits to progress toward PLOF   Short Term Goal # 2 Pt will perform STS with FWW and SPV in 6 visits to progress OOB mobility   Short Term Goal # 3 Pt will perform functional transfers with FWW and SPV in 6 visits to progress OOB mobility

## 2023-03-10 ENCOUNTER — HOSPITAL ENCOUNTER (INPATIENT)
Facility: REHABILITATION | Age: 83
End: 2023-03-10
Attending: PHYSICAL MEDICINE & REHABILITATION | Admitting: PHYSICAL MEDICINE & REHABILITATION
Payer: MEDICARE

## 2023-03-10 ENCOUNTER — PHARMACY VISIT (OUTPATIENT)
Dept: PHARMACY | Facility: MEDICAL CENTER | Age: 83
End: 2023-03-10
Payer: COMMERCIAL

## 2023-03-10 VITALS
OXYGEN SATURATION: 94 % | HEART RATE: 80 BPM | TEMPERATURE: 98.4 F | RESPIRATION RATE: 16 BRPM | SYSTOLIC BLOOD PRESSURE: 122 MMHG | DIASTOLIC BLOOD PRESSURE: 66 MMHG | WEIGHT: 124.56 LBS | BODY MASS INDEX: 24.33 KG/M2

## 2023-03-10 LAB — 1,25(OH)2D3 SERPL-MCNC: 92.6 PG/ML (ref 19.9–79.3)

## 2023-03-10 PROCEDURE — 97166 OT EVAL MOD COMPLEX 45 MIN: CPT

## 2023-03-10 PROCEDURE — RXMED WILLOW AMBULATORY MEDICATION CHARGE: Performed by: STUDENT IN AN ORGANIZED HEALTH CARE EDUCATION/TRAINING PROGRAM

## 2023-03-10 PROCEDURE — A9270 NON-COVERED ITEM OR SERVICE: HCPCS | Performed by: HOSPITALIST

## 2023-03-10 PROCEDURE — 700102 HCHG RX REV CODE 250 W/ 637 OVERRIDE(OP): Performed by: STUDENT IN AN ORGANIZED HEALTH CARE EDUCATION/TRAINING PROGRAM

## 2023-03-10 PROCEDURE — A9270 NON-COVERED ITEM OR SERVICE: HCPCS | Performed by: STUDENT IN AN ORGANIZED HEALTH CARE EDUCATION/TRAINING PROGRAM

## 2023-03-10 PROCEDURE — 700102 HCHG RX REV CODE 250 W/ 637 OVERRIDE(OP): Performed by: HOSPITALIST

## 2023-03-10 PROCEDURE — 99238 HOSP IP/OBS DSCHRG MGMT 30/<: CPT | Mod: GC | Performed by: HOSPITALIST

## 2023-03-10 RX ORDER — CHOLECALCIFEROL (VITAMIN D3) 50 MCG
2000 TABLET ORAL DAILY
Qty: 30 TABLET | Refills: 2 | Status: SHIPPED | OUTPATIENT
Start: 2023-03-10 | End: 2023-06-08

## 2023-03-10 RX ORDER — LOSARTAN POTASSIUM 100 MG/1
50 TABLET ORAL DAILY
Qty: 30 TABLET | Refills: 0 | Status: SHIPPED | OUTPATIENT
Start: 2023-03-10 | End: 2024-04-16

## 2023-03-10 RX ORDER — ACETAMINOPHEN 500 MG
1000 TABLET ORAL EVERY 6 HOURS PRN
Qty: 90 TABLET | Refills: 0 | Status: SHIPPED | OUTPATIENT
Start: 2023-03-10 | End: 2023-04-09

## 2023-03-10 RX ORDER — METOPROLOL TARTRATE 25 MG/1
25 TABLET, FILM COATED ORAL 2 TIMES DAILY
Qty: 60 TABLET | Refills: 0 | Status: SHIPPED | OUTPATIENT
Start: 2023-03-10

## 2023-03-10 RX ADMIN — NICOTINE 14 MG: 14 PATCH, EXTENDED RELEASE TRANSDERMAL at 04:09

## 2023-03-10 RX ADMIN — LOSARTAN POTASSIUM 50 MG: 50 TABLET, FILM COATED ORAL at 04:08

## 2023-03-10 RX ADMIN — METOPROLOL TARTRATE 25 MG: 25 TABLET, FILM COATED ORAL at 04:09

## 2023-03-10 RX ADMIN — SENNOSIDES AND DOCUSATE SODIUM 2 TABLET: 50; 8.6 TABLET ORAL at 04:09

## 2023-03-10 RX ADMIN — ACETAMINOPHEN 1000 MG: 500 TABLET, FILM COATED ORAL at 08:54

## 2023-03-10 RX ADMIN — ACETAMINOPHEN 1000 MG: 500 TABLET, FILM COATED ORAL at 14:31

## 2023-03-10 ASSESSMENT — COGNITIVE AND FUNCTIONAL STATUS - GENERAL
SUGGESTED CMS G CODE MODIFIER DAILY ACTIVITY: CH
DAILY ACTIVITIY SCORE: 24

## 2023-03-10 ASSESSMENT — ACTIVITIES OF DAILY LIVING (ADL): TOILETING: INDEPENDENT

## 2023-03-10 ASSESSMENT — PAIN DESCRIPTION - PAIN TYPE: TYPE: ACUTE PAIN

## 2023-03-10 ASSESSMENT — PATIENT HEALTH QUESTIONNAIRE - PHQ9
2. FEELING DOWN, DEPRESSED, IRRITABLE, OR HOPELESS: NOT AT ALL
SUM OF ALL RESPONSES TO PHQ9 QUESTIONS 1 AND 2: 0
1. LITTLE INTEREST OR PLEASURE IN DOING THINGS: NOT AT ALL

## 2023-03-10 NOTE — FACE TO FACE
Face to Face Supporting Documentation - Home Health    The encounter with this patient was in whole or in part the primary reason for home health admission.    Date of encounter:   Patient:                    MRN:                       YOB: 2024  Susan Garcia  [id number]  1940     Home health to see patient for:  Skilled Nursing care for assessment, interventions & education, Physical Therapy evaluation and treatment, and Occupational therapy evaluation and treatment    Skilled need for:  Surgical Aftercare ankle surgery    Skilled nursing interventions to include:  Wound Care    Homebound status evidenced by:  Need the aid of supportive devices such as crutches, canes, wheelchairs or walkers. Leaving home requires a considerable and taxing effort. There is a normal inability to leave the home.    Community Physician to provide follow up care: No primary care provider on file.     Optional Interventions? No      I certify the face to face encounter for this home health care referral meets the CMS requirements and the encounter/clinical assessment with the patient was, in whole, or in part, for the medical condition(s) listed above, which is the primary reason for home health care. Based on my clinical findings: the service(s) are medically necessary, support the need for home health care, and the homebound criteria are met.  I certify that this patient has had a face to face encounter by myself.  Tylor Gibson M.D. - NPI: [id number]

## 2023-03-10 NOTE — DISCHARGE PLANNING
Other Ortho ongoing medical management as well as therapy need. Please consider a PM&R referral to assist with discharge planning. Potential daughter support Medicare provider.

## 2023-03-10 NOTE — THERAPY
Occupational Therapy   Initial Evaluation     Patient Name: Susan Garcia  Age:  83 y.o., Sex:  female  Medical Record #: [id number]  Today's Date: 3/10/2023     Precautions  Precautions: Fall Risk, Weight Bearing As Tolerated Left Lower Extremity, CAM Boot (WBAT for txf only)  Comments: WB for transfers only    Assessment  Patient is 83 y.o. female with a diagnosis of L Ankle fx, s/p ORIF.  Pt currently limited by decreased functional mobility, activity tolerance,balance, and pain which are affecting pt's ability to complete ADLs/IADLs at baseline. Pt would benefit from OT services in the acute care setting to maximize functional recovery.      Plan    Occupational Therapy Initial Treatment Plan   Treatment Interventions: Therapeutic Activity  Treatment Frequency: 3 Times per Week  Duration: 3 Visits       Discharge Recommendations: (P) Anticipate that the patient will have no further occupational therapy needs after discharge from the hospital (at w/c level)        03/10/23 1136   Prior Living Situation   Prior Services Home-Independent   Housing / Facility 1 Story House   Steps Into Home 0   Bathroom Set up Walk In Shower  (family is getting a shower chair)   Equipment Owned 4-Wheel Walker   Lives with - Patient's Self Care Capacity Adult Children   Prior Level of ADL Function   Self Feeding Independent   Grooming / Hygiene Independent   Bathing Independent   Dressing Independent   Toileting Independent   ADL Assessment   Grooming Supervision   Upper Body Dressing Supervision   Lower Body Dressing Supervision  (with sock and shoe)   Functional Mobility   Sit to Stand Contact Guard Assist   Bed, Chair, Wheelchair Transfer Contact Guard Assist   Short Term Goals   Short Term Goal # 1 supervised with ADL txfs   Occupational Therapy Initial Treatment Plan    Treatment Interventions Therapeutic Activity   Treatment Frequency 3 Times per Week   Duration 3 Visits   Anticipated Discharge Equipment and Recommendations    Discharge Recommendations Anticipate that the patient will have no further occupational therapy needs after discharge from the hospital  (at w/c level)

## 2023-03-10 NOTE — DISCHARGE INSTRUCTIONS
follow up with outpatient cardiology for aortic stenosis, referral placed.  Per orthopedic surgery, transfers only; no weight bearing on your ankle that had surgery.

## 2023-03-10 NOTE — PROGRESS NOTES
[facility] Internal Medicine Daily Progress Note    Date of Service  3/9/2023    [employer] Team: [employer] IM Blue Team   Attending: Lo Bunch M.d.  Senior Resident: Dr. Marroquin  Intern:  Dr. Gibson  Contact Number: [phone]    Chief Complaint  Susan Garcia is a 83 y.o. female admitted 3/7/2023 with Left ankle pain.     Hospital Course  Susan Garcia is a 83 y.o. female who presented 3/7/2023 with past medical history  of CABG 2 vessel, stroke x2, hypertension, hyperlipidemia who sustained a fall 4 days prior to her transfer .She was seen in a [facility] and diagnosed with a trimalleolar fracture of the left ankle.  The patient was given pain management and splinted and bandaged.The plan was for patient to be taken to the operating room at that facility however due to her extensive cardiac history ,an echocardiogram was performed. The echocardiogram showed moderate aortic stenosis.  Patient was transferred to [facility] for further evaluation with cardiology prior to any operative treatment     Dr. Gavin of cardiology will review the case and give his input     Dr. Anirudh Lynne of orthopedics will review the case.    X-rays from the [facility] shows     Trimalleolar communited fracture with sublaxion.        There is also an incidental finding of a low sodium of 120 at the time of her admission at [facility].  Patient was on hydrochlorothiazide for hypertension and this was thought to be the cause.  Hydrochlorothiazide was held and it was noted that the patient's sodium was increasing however there was not a documented updated sodium level that I could find in the transfer documents.     A stat BMP was ordered-current sodium level is 139     On plavix- last taken 4 days ago    Interval Problem Update  No acute overnight events; Patient reports her pain is under good control this morning; she is agreeable to her transfer back agreement with [facility]. She reports she has a good  appetite; she denies having a bowel movement in the past 24 hours however is passing gas. She denies any other problems or complaints at this time. Patient is medically stable from Ortho and Cardiology standpoints; she is to followup with her outpatient cardiologist upon discharge. Patient was continued on half dose of home metoprolol and losartan yesterday, amlodipine was resumed today at half dose.     I have discussed this patient's plan of care and discharge plan at IDT rounds today with Case Management, Nursing, Nursing leadership, and other members of the IDT team.    Consultants/Specialty  cardiology and orthopedics    Code Status  Full Code    Disposition  Patient is not medically cleared for discharge.   Anticipate discharge to  [facility] as per transfer back agreement .  I have placed the appropriate orders for post-discharge needs.    Review of Systems  Review of Systems   All other systems reviewed and are negative.     Physical Exam  Temp:  [36.5 °C (97.7 °F)-36.7 °C (98 °F)] 36.7 °C (98 °F)  Pulse:  [60-70] 69  Resp:  [18] 18  BP: (112-121)/(48-59) 115/54  SpO2:  [92 %-98 %] 95 %    Physical Exam  Vitals reviewed.   Constitutional:       Appearance: Normal appearance.   HENT:      Head: Normocephalic and atraumatic.      Mouth/Throat:      Mouth: Mucous membranes are moist.   Eyes:      Extraocular Movements: Extraocular movements intact.   Cardiovascular:      Rate and Rhythm: Normal rate and regular rhythm.      Pulses: Normal pulses.      Heart sounds: Normal heart sounds.   Pulmonary:      Effort: Pulmonary effort is normal.      Breath sounds: Normal breath sounds.   Abdominal:      General: Abdomen is flat. Bowel sounds are normal.      Palpations: Abdomen is soft.   Musculoskeletal:         General: Normal range of motion.      Cervical back: Normal range of motion.   Feet:      Comments: Left foot dressed, clean dry and intact; elevated upon bed in protective pressure off-setting  boot  Skin:     General: Skin is warm and dry.   Neurological:      Mental Status: She is alert and oriented to person, place, and time.   Psychiatric:         Mood and Affect: Mood normal.         Behavior: Behavior normal.       Fluids    Intake/Output Summary (Last 24 hours) at 3/9/2023 1647  Last data filed at 3/9/2023 0900  Gross per 24 hour   Intake --   Output 1100 ml   Net -1100 ml         Laboratory  Recent Labs     03/07/23  1443 03/08/23  0128   WBC 12.0* 10.4   RBC 3.42* 3.39*   HEMOGLOBIN 10.6* 10.7*   HEMATOCRIT 30.6* 30.6*   MCV 89.5 90.3   MCH 31.0 31.6   MCHC 34.6 35.0   RDW 43.2 43.9   PLATELETCT 230 235   MPV 9.4 9.5       Recent Labs     03/07/23  1443 03/08/23  0128   SODIUM 139 137   POTASSIUM 4.2 3.7   CHLORIDE 107 103   CO2 21 23   GLUCOSE 115* 101*   BUN 12 10   CREATININE 0.78 0.84   CALCIUM 8.9 8.8       Recent Labs     03/07/23  1443   INR 1.04                 Imaging  DX-ANKLE 3+ VIEWS LEFT   Final Result      1.  Anatomic alignment of left distal fibula status post ORIF      DX-PORTABLE FLUORO > 1 HOUR   Final Result      Portable fluoroscopy utilized for 54 seconds.      INTERPRETING LOCATION: [address] [place] [address], [id number]      DX-ANKLE 3+ VIEWS LEFT   Final Result      1.  There is a trimalleolar left ankle fracture with mild widening of the medial tibiotalar joint space.             Assessment/Plan  Problem Representation:    * Closed fracture of left ankle with delayed healing- (present on admission)  Assessment & Plan  Pain control with Tylenol    Orthopedic surgery on 3/8/23 of Open treatment of left trimalleolar ankle fracture without fixation of posterior lip, open reduction internal fixation of syndesmotic injury left ankle  Continue DVT ppx with lovenox   Orthopedic recommendations: Weightbearing as tolerated to the left lower extremity for transfers;  Elevate the leg at rest for swelling and pain relief; Float the heel to avoid additional heel pressure injury; Wound care  consult for heel pressure injury and anterior ankle pressure injury likely from previous splinting.    Mobilize with physical and occupational therapies.    DVT prophylaxis with SCDs and Lovenox until mobilizing independently and then can be switched to aspirin for 4 weeks.    The patient will follow up in clinic in 2 weeks to check wounds and remove sutures/staples.    With further wound healing she can weight-bear and activity as tolerated in the boot    Primary hypertension- (present on admission)  Assessment & Plan  Currently controlled    Continue Cozaar and Toprol XL at half home dose; resumed amlodipine at half dose today; continue to hold hydrochlorothiazide    Patient is to followup with her outpatient cardiologist upon discharge     Aortic valve stenosis, severe- (present on admission)  Assessment & Plan  The valvular diameter at this time 0.7 characterized the stenosis to moderate to severe    Cardiology will comment on recommendations prior to possible surgery    Continue beta-blocker at half of home dose    Control BP    Tobacco abuse- (present on admission)  Assessment & Plan  Smoking cessation advised    Nicotine patch ordered         VTE prophylaxis: enoxaparin ppx    I have performed a physical exam and reviewed and updated ROS and Plan today (3/9/2023). In review of yesterday's note (3/8/2023), there are no changes except as documented above.

## 2023-03-10 NOTE — PROGRESS NOTES
Orthopaedic Progress Note    Interval changes:  Patient doing well    Splint CDI  Cleared for DC by ortho pending medicine clearance    ROS - Patient denies any new issues.  Pain well controlled.    /66   Pulse 80   Temp 36.9 °C (98.4 °F) (Temporal)   Resp 16   Wt 56.5 kg (124 lb 9 oz)   SpO2 94%       Patient seen and examined  No acute distress  Breathing non labored  RRR  LLE in short leg splint CDI, DNVI, moves all toes, cap refill <2 sec.       Recent Labs     03/07/23  1443 03/08/23  0128   WBC 12.0* 10.4   RBC 3.42* 3.39*   HEMOGLOBIN 10.6* 10.7*   HEMATOCRIT 30.6* 30.6*   MCV 89.5 90.3   MCH 31.0 31.6   MCHC 34.6 35.0   RDW 43.2 43.9   PLATELETCT 230 235   MPV 9.4 9.5       Active Hospital Problems    Diagnosis     Tobacco abuse [Z72.0]      Priority: Low    Aortic valve stenosis, severe [I35.0]     Closed fracture of left ankle with delayed healing [S82.892G]     Primary hypertension [I10]        Assessment/Plan:  Patient doing well    Splint CDI  Cleared for DC by ortho pending medicine clearance  POD#2 S/P Open treatment of left trimalleolar ankle fracture without fixation of posterior lip, open reduction internal fixation of syndesmotic injury left ankle  Wt bearing status - LLE transfers only, no ambulation.  Wound care/Drains - Splint left in place  Future Procedures - none  Lovenox: Start 3/8, Duration-until ambulatory > 150'  Sutures/Staples out- 14 days post operatively  PT/OT-initiated  Antibiotics: Perioperative completed  DVT Prophylaxis- TEDS/SCDs/Foot pumps  Combs-none  Case Coordination for Discharge Planning - Disposition home vs SNF

## 2023-03-10 NOTE — DISCHARGE PLANNING
Case Management Discharge Planning    Admission Date: 3/7/2023  GMLOS: 2.7  ALOS: 3    6-Clicks ADL Score: 16  6-Clicks Mobility Score: 12  PT and/or OT Eval ordered: Yes  Post-acute Referrals Ordered: Yes  Post-acute Choice Obtained: Yes  Has referral(s) been sent to post-acute provider:  No-[place]: [facility] referral printed and provided to patient to self-schedule upon returning home.       Anticipated Discharge Dispo: Discharge Disposition: D/T to home under [facility] care in anticipation of covered skilled care (06)    DME Needed: No    Action(s) Taken: Updated Provider/Nurse on Discharge Plan and Choice obtained    Escalations Completed: None    Medically Clear: Yes    Next Steps: None    Barriers to Discharge: None    CM completed chart review, attended IDT rounds, coordinated care with providers, and met with patient at the bedside.  She will DC home with [facility] that she will self-schedule upon returning to [place] (referral order for [facility] printed and provided to patient).  Her daughter and son will drive her home today.  No further CM needs at this time.

## 2023-03-10 NOTE — DISCHARGE PLANNING
[facility] Transitional Care Coordination    Referral from: Dr. Gibson    Insurance Provider on Facesheet: [facility]    Potential Rehab Diagnosis: Other Ortho    Chart review indicates patient may have on going medical management and may have therapy needs to possibly meet inpatient rehab facility criteria with the goal of returning to community.    D/C support will need to be verified: Daughter    Physiatry consultation pended per protocol.  OT eval pending.     Thank you for the referral.     1208-Spoke briefly with Court, daughter.  Choice is to take her home with 24/7.  [facility] will no longer follow.  Please reach out to myself with any interval changes/questions.

## 2023-03-10 NOTE — PROGRESS NOTES
Patient is being discharged to home with family and home health at this time. Patient is stable and has no IV. Patient dressing changed to Left ankle and there is no drainage noted or s/s of infection. Tylenol given for pain prior to discharge.

## 2023-03-10 NOTE — CARE PLAN
The patient is Stable - Low risk of patient condition declining or worsening    Shift Goals  Clinical Goals: Pain management  Patient Goals: Rest and safety  Family Goals: FINA    Progress made toward(s) clinical / shift goals:    Problem: Knowledge Deficit - Standard  Goal: Patient and family/care givers will demonstrate understanding of plan of care, disease process/condition, diagnostic tests and medications  Outcome: Met     Problem: Skin Integrity  Goal: Skin integrity is maintained or improved  Outcome: Met  There are no pressure ulcers noted at this time, skin is intact.     Problem: Pain - Standard  Goal: Alleviation of pain or a reduction in pain to the patient’s comfort goal  Outcome: Met  Patient has pain which has been managed by Tylenol     Problem: Fall Risk  Goal: Patient will remain free from falls  Outcome: Met  Patient has call light to her reach and verbalized understanding with calling nurse for assistance.

## 2023-03-11 NOTE — DISCHARGE SUMMARY
[facility] Internal Medicine Discharge Summary    Attending: Dr. Bunch  Senior Resident: Dr. Marroquin  Intern:  Dr. Gibson  Contact Number: [phone]    CHIEF COMPLAINT ON ADMISSION  Susan Garcia is a 83 y.o. female admitted 3/7/2023 with Left ankle pain.     Reason for Admission  Ankle fracture     Admission Date  3/7/2023    CODE STATUS  Prior    HPI & HOSPITAL COURSE  Susan Garcia is a 83 y.o. female who presented 3/7/2023 with past medical history  of CABG 2 vessel, stroke x2, hypertension, hyperlipidemia who sustained a fall 4 days prior to her transfer from [facility].  She was seen at [facility] and diagnosed with a trimalleolar fracture of the left ankle.  The patient was given pain management, splinted and bandaged. The plan was for patient to be taken to the operating room at that facility however due to her extensive cardiac history, an echocardiogram was performed. The echocardiogram showed moderate aortic stenosis.  Patient was transferred to [facility] for further evaluation with cardiology prior to any operative treatment.    * Closed fracture of left ankle with delayed healing- (present on admission)  While admitted:  X-rays from the outlying facility shows Trimalleolar communited fracture with sublaxion  Pain control with Tylenol   Orthopedic surgery on 3/8/23 of Open treatment of left trimalleolar ankle fracture without fixation of posterior lip, open reduction internal fixation of syndesmotic injury left ankle  Continue DVT ppx with lovenox  Assessment & Plan  Orthopedic recommendations: Weightbearing as tolerated to the left lower extremity for transfers; Elevate the leg at rest for swelling and pain relief; Float the heel to avoid additional heel pressure injury; Wound care consult for heel pressure injury and anterior ankle pressure injury likely from previous splinting.    Mobilize with physical and occupational therapies.    DVT prophylaxis with SCDs and Lovenox  until mobilizing independently and then can be switched to aspirin for 4 weeks.    The patient will follow up in clinic in 2 weeks (on 3/22/23) to check wounds and remove sutures/staples.    With further wound healing she can weight-bear and activity as tolerated in the boot.  Patient is going home with home health because she refused SNF and transfer back to [facility] for ongoing PT/OT needs; risk of fall, fracture, head bleed, and further complications were explained to the patient and she accepted the risk.     Primary hypertension- (present on admission)  while admitted: Continued Cozaar and Toprol XL at half home dose; held amlodipine; held hydrochlorothiazide (There is also an incidental finding of a low sodium of 120 at the time of her admission at outlying facility. Patient was on hydrochlorothiazide for hypertension and this was thought to be the cause. Hydrochlorothiazide was held and it was noted that the patient's sodium was increasing;  A stat BMP was ordered-current sodium level is 139)  Assessment & Plan  Patient is to follow up with her outpatient cardiologist upon discharge.  Continue Cozaar 100mg daily and  continue metoprolol tartrate 25mg daily   Stopped amlodipine  10mg daily, hydrochlorothiazide and metoprolol SR 100mg daily      Aortic valve stenosis, severe- (present on admission)  While admitted:  The valvular diameter at this time 0.7 characterized the stenosis to moderate to severe  Evaluated by Cardiology; ok to undergo surgery  Continued beta-blocker at half of home dose  Control BP  Assessment & Plan  Patient is to follow up with her outpatient cardiologist upon discharge.     Tobacco abuse- (present on admission)  While admitted:   Smoking cessation advised  Nicotine patch ordered  Assessment & Plan  advised smoking cessation        Therefore, she is discharged in fair and stable condition to home with organized home healthcare and close outpatient follow-up.    The patient met  2-midnight criteria for an inpatient stay at the time of discharge.    Discharge Date  3/10/2023    Physical Exam on Day of Discharge  Physical Exam  Vitals reviewed.   Constitutional:       Appearance: Normal appearance.   HENT:      Head: Normocephalic and atraumatic.      Mouth/Throat:      Mouth: Mucous membranes are moist.   Eyes:      Extraocular Movements: Extraocular movements intact.   Cardiovascular:      Rate and Rhythm: Normal rate and regular rhythm.      Pulses: Normal pulses.      Heart sounds: Normal heart sounds.   Pulmonary:      Effort: Pulmonary effort is normal.      Breath sounds: Normal breath sounds.   Abdominal:      General: Abdomen is flat. Bowel sounds are normal.      Palpations: Abdomen is soft.   Musculoskeletal:         General: Normal range of motion.      Cervical back: Normal range of motion and neck supple.   Feet:      Right foot:      Skin integrity: Skin integrity normal.      Toenail Condition: Right toenails are abnormally thick.      Left foot:      Skin integrity: Skin integrity normal.      Toenail Condition: Left toenails are abnormally thick.      Comments: Left lower extremity dressed, clean, dry and intact; in offloading boot  Skin:     General: Skin is warm and dry.   Neurological:      General: No focal deficit present.      Mental Status: She is alert and oriented to person, place, and time.   Psychiatric:         Mood and Affect: Mood normal.         Behavior: Behavior normal.       FOLLOW UP ITEMS POST DISCHARGE  Follow up with your outpatient Cardiologist; follow up with your Orthopedic surgeon 3/22 for wound check and suture/staple removal.     DISCHARGE DIAGNOSES  Principal Problem:    Closed fracture of left ankle with delayed healing POA: Yes  Active Problems:    Tobacco abuse POA: Yes    Aortic valve stenosis, severe POA: Yes    Primary hypertension POA: Yes  Resolved Problems:    * No resolved hospital problems. *      FOLLOW UP  No future appointments.  No  follow-up provider specified.    MEDICATIONS ON DISCHARGE     Medication List        START taking these medications        Instructions   acetaminophen 500 MG Tabs  Commonly known as: TYLENOL   Take 2 Tablets by mouth every 6 hours as needed for Moderate Pain for up to 30 days.  Dose: 1,000 mg     metoprolol tartrate 25 MG Tabs  Commonly known as: LOPRESSOR   Take 1 Tablet by mouth 2 times a day.  Dose: 25 mg            CHANGE how you take these medications        Instructions   * D3 2000 UNIT Tabs  What changed: Another medication with the same name was added. Make sure you understand how and when to take each.  Generic drug: Cholecalciferol   Take 2,000 Units by mouth every day.  Dose: 2,000 Units     * vitamin D 2000 UNIT Tabs  What changed: You were already taking a medication with the same name, and this prescription was added. Make sure you understand how and when to take each.   Take 1 Tablet by mouth every day for 90 days.  Dose: 2,000 Units     losartan 100 MG Tabs  What changed: how much to take  Commonly known as: COZAAR   Take 0.5 Tablets by mouth every day.  Dose: 50 mg           * This list has 2 medication(s) that are the same as other medications prescribed for you. Read the directions carefully, and ask your doctor or other care provider to review them with you.                CONTINUE taking these medications        Instructions   clopidogrel 75 MG Tabs  Commonly known as: PLAVIX   Take 1 Tab by mouth every day.  Dose: 75 mg     Eye Vitamins & Minerals Tabs   Take 1 Tab by mouth every morning.  Dose: 1 Tablet            STOP taking these medications      amLODIPine 10 MG Tabs  Commonly known as: NORVASC     coenzyme Q-10 100 MG Caps capsule     metoprolol  MG Tb24  Commonly known as: TOPROL XL              Allergies  No Known Allergies    DIET  No orders of the defined types were placed in this encounter.      ACTIVITY  As tolerated.  Weight bearing for transfers  only    CONSULTATIONS  Cardiology, Orthopedic Surgery, Physiatry    PROCEDURES  Open treatment of left trimalleolar ankle fracture without fixation of posterior lip, open reduction internal fixation of syndesmotic injury left ankle    LABORATORY  Lab Results   Component Value Date    SODIUM 137 03/08/2023    POTASSIUM 3.7 03/08/2023    CHLORIDE 103 03/08/2023    CO2 23 03/08/2023    GLUCOSE 101 (H) 03/08/2023    BUN 10 03/08/2023    CREATININE 0.84 03/08/2023        Lab Results   Component Value Date    WBC 10.4 03/08/2023    HEMOGLOBIN 10.7 (L) 03/08/2023    HEMATOCRIT 30.6 (L) 03/08/2023    PLATELETCT 235 03/08/2023        Total time of the discharge process exceeds 36 minutes.

## 2023-03-15 ENCOUNTER — TELEPHONE (OUTPATIENT)
Dept: HEALTH INFORMATION MANAGEMENT | Facility: OTHER | Age: 83
End: 2023-03-15
Payer: MEDICARE

## 2023-03-20 ENCOUNTER — TELEPHONE (OUTPATIENT)
Dept: CARDIOLOGY | Facility: MEDICAL CENTER | Age: 83
End: 2023-03-20
Payer: MEDICARE

## 2023-03-20 NOTE — TELEPHONE ENCOUNTER
Referral from: Dr. Rubin Marroquin for Moderate Amrita Stenosis    Patient called on 03/20/2023.    Discussed referral and recommendation for consult.    Offered to contact patient's daughter to discuss scheduling, but she declined.     Per patient, she is not currently followed by cardiology. Patient states she would like to schedule follow up for her ankle before committing to any additional appointments at this time. She will reach out when she is ready to proceed.    Phone number given to patient for [facility] for any further questions or concerns.      Previous Valve Information:  AVR 2011  [place], [place]  Serial/model # unknown

## 2023-04-04 NOTE — TELEPHONE ENCOUNTER
Called to check in with patient.     Spoke with patient's daughter, Court. Consultation appointment scheduled with Dr. Holman.     Phone number given to Court for [facility] for any further questions or concerns.

## 2023-04-24 ENCOUNTER — APPOINTMENT (OUTPATIENT)
Dept: CARDIOLOGY | Facility: MEDICAL CENTER | Age: 83
End: 2023-04-24
Payer: MEDICARE

## 2023-04-25 ENCOUNTER — APPOINTMENT (OUTPATIENT)
Dept: CARDIOLOGY | Facility: MEDICAL CENTER | Age: 83
End: 2023-04-25
Payer: MEDICARE

## 2023-05-22 ENCOUNTER — TELEPHONE (OUTPATIENT)
Dept: CARDIOLOGY | Facility: MEDICAL CENTER | Age: 83
End: 2023-05-22
Payer: MEDICARE

## 2023-05-22 NOTE — TELEPHONE ENCOUNTER
Reviewing surveillance, consultation appointment with Dr. Holman was cancelled last month.    Called and spoke to patient's daughter, Court. Discussed rescheduling visit. Per Court, she is unable to reschedule at this time.     Advised to reach out when ready to proceed. Phone number given to Court for [facility]. Court verbalizes understanding.

## 2024-04-16 ENCOUNTER — HOSPITAL ENCOUNTER (OUTPATIENT)
Facility: MEDICAL CENTER | Age: 84
End: 2024-04-16
Attending: ORTHOPAEDIC SURGERY | Admitting: ORTHOPAEDIC SURGERY
Payer: MEDICARE

## 2024-04-16 VITALS — HEIGHT: 60 IN | WEIGHT: 126 LBS | BODY MASS INDEX: 24.74 KG/M2

## 2024-04-16 DIAGNOSIS — G89.18 POSTOPERATIVE PAIN: ICD-10-CM

## 2024-04-16 PROBLEM — T84.7XXA INFECTED HARDWARE IN LEFT LEG (HCC): Status: ACTIVE | Noted: 2024-04-16

## 2024-04-16 NOTE — LETTER
April 29, 2024    Patient Name: Susan Garcia  Surgeon Name: Landon Bucio M.D.  Surgery Facility: [facility] ([address] [place] [address])  Surgery Date: 5/16/2024    The time of your surgery is not final and may change up to and until the day of your surgery. You will be contacted 24-48 hours prior to your surgery date with your check-in and surgery time.    If you will not be at one of the below numbers please call the surgery scheduler at [phone]  Preferred Phone: [phone]    BEFORE YOUR SURGERY   Pre Registration and/or Lab Work must be done within and no earlier than 28 days prior to your surgery date. Your scheduled facility will contact you regarding all required preregistration and/or lab work. If you have not been contacted within 7 days of your scheduled procedure please call [facility] at [phone] for an appointment as soon as possible.    DAY OF YOUR SURGERY  Nothing to eat or drink after midnight     Refrain from smoking any substance after midnight prior to surgery. Smoking may interfere with the anesthetic and frequently produces nausea during the recovery period.    Continue taking all lifesaving medications. Including the morning of your surgery with small sip of water.    Please do NOT take on the day of surgery:  Diuretics: examples- furosemide (Lasix), spironolactone, hydrochlorothiazide  ACE-inhibitors: examples- lisinopril, ramipril, enalapril  “ARBs”: examples- losartan, Olmesartan, valsartan    Please arrive at the hospital/surgery center at the check-in time provided.     An adult will need to bring you and take you home after your surgery.     AFTER YOUR SURGERY  Post op Appointment:   Date: 5.29.24   Time: 1:00 PM    With: Deonte Castro PA-C    Location: [address] [place] [id number]    - Post Surgery - You will need someone to drive you home  - Post Surgery - You will need someone to stay with you for 24 hours     TIME OFF  WORK  FMLA or Disability forms can be faxed directly to: [phone] or you may drop them off at [address] [place] [address]. Our office charges a $35.00 fee per form. Forms will be completed within 10 business days of drop off and payment received. For the status of your forms you may contact our disability office directly [phone].    MEDICATION INSTRUCTIONS **Please read section completely**  The following medications should be stopped a minimum of 10 days prior to surgery:  All over the counter, Supplements & Herbal medications    Anorectics: Phentermine (Adipex-P, Lomaira and Suprenza), Phentermine-topiramate (Qsymia), Bupropion-naltrexone (Contrave)    **If you are on Bupropion for anxiety/depression, please continue this medication up until the day of surgery.     Opiod Partial Agonists/Opioid Antagonists: Buprenorphine (Suboxone, Belbuca, Butrans, Probuphine Implant, Sublocade), Naltrexone (ReVia, Vivitrol), Naloxone    Amphetamines: Dextroamphetamine/Amphetamine (Adderall, Mydayis), Methylphenidate Hydrochloride (Concerta, Metadate, Methylin, Ritalin)    The following medications should be stopped 5 days prior to surgery:  Blood Thinners: Any Aspirin, Aspirin products, anti-inflammatories such as ibuprofen and any blood thinners such as Coumadin and Plavix. Please consult your prescribing physician if you are on life saving blood thinners, in regards to when to stop medications prior to surgery.     The following medications should be stopped a minimum of 3 days prior to surgery:  PDE-5 inhibitors: Sildenafil (Viagra), Tadalafil (Cialis), Vardenafil (Levitra), Avanafil (Stendra)    MAO Inhibitors: Rasagiline (Azilect), Selegiline (Eldepryl, Emsam, Selapar), Isocarboxazid (Marplan), Phenelzine (Nardil)

## 2024-04-16 NOTE — H&P (VIEW-ONLY)
Subjective   Patient ID:  Susan Garcia is a 84 y.o. female.    Chief Complaint:  Follow-Up of the Left Ankle    Last Surgery: Orif, Ankle - Left on 3/8/2023    HPI  Susan returns today still with wound issues. She states it is the same wound that we had issues with ever since her surgery on 3/8/23. She has been on multiple courses of antibiotics. No new injuries.  Pain Assessment  Pain Assessment: 0-10  Pain Score: 6  Pain Location: Ankle  Pain Orientation: Left      Review of Systems    Objective     General: Well nourished, well developed, age appropriate appearance   HEENT: Normocephalic, atraumatic  Psych: Normal mood and affect  Neck: Supple, no pain to motion  Chest/Pulmonary: breathing unlabored, no audible wheezing  Ortho: 2 small 2-3 mm draining wounds to lateral ankle with cloudy serous drainage. Some localized erythema but no lymphangitis. No pain to ankle motion. NVI    Last Imaging Result(s):   DX-ANKLE 3+ VIEWS LEFT  AP lateral mortise views of the left ankle show stable hardware fixation   of an ankle fracture that is now well-healed.  No hardware loosening or   failure.  Soft tissue swelling is seen laterally      Assessment & Plan   Encounter Diagnoses:   Closed trimalleolar fracture of left ankle with routine healing, subsequent encounter    I had a thorough discussion with the patient regarding the diagnosis including both operative and nonoperative treatment. She has a chronic infection at this time likely related to her hardware so I recommended removing this. After obtaining consent from the patient, we will proceed with operative management. 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     Risks of surgery include, but not limited to, wound problems, infection, nerve injury, vascular injury, need for further surgery. Risk for weakness, stiffness, blood clots, chance for reinjury, malunion, nonunion, overcorrection,  undercorrection and recurrence. I discussed the risks/ benefits/ complications associated with narcotic use including the potential for addiction. All of the patient's questions were answered today. We will schedule this in the near future at her convenience. I will follow up with her postoperatively.    In the meantime we gave her new dressings.    Orders Placed This Encounter    Surgical Case Request: REMOVAL, HARDWARE, LOWER EXTREMITY    Casting    DX-ANKLE 3+ VIEWS LEFT       No follow-ups on file.    I, Samm Phillips, am scribing for, and in the presence of Landon Bucio M.D..    I, Landon Bucio M.D., personally performed the services described in this documentation, as scribed by, Samm Phillips, in my presence. I do hereby attest this information is true, accurate, and complete to the best of my knowledge.

## 2024-04-24 ENCOUNTER — PRE-ADMISSION TESTING (OUTPATIENT)
Dept: ADMISSIONS | Facility: MEDICAL CENTER | Age: 84
End: 2024-04-24
Payer: MEDICARE

## 2024-04-24 NOTE — OR NURSING
"RN tele pre admit appointment completed with daughter, Court.  Court verbalized an understanding that her mom is not to take any vitamins, minerals, or supplements between now and surgery and no NSAIDs including ibuprofen, advil, motrin, naproxen or aleve between now and surgery. Court also verbalized an understanding that patient is not to take her losartan for 24 hours prior to surgery. Okay to continue amlodipine and metoprolol as prescribed.  Plavix instructions need to come from [facility]. Court stated they didn't receive any instructions regarding the plavix. This RN sent a Case Message to Bryanna Godinez, medical assistant at the [facility] in regards to the Plavix.   Preparing For Your Procedure packet reviewed with daughter, including fasting and bathing guidelines.  Surgery date 4/25/2024.  The above information is per anesthesia guidelines.    *Update*  This RN made two more attempts in regards to patient taking plavix.  Spoke to patient's daughter this afternoon and she stated she hadn't heard from the [facility] and will have patient hold her plavix in the morning along with her losartan. Daughter also stated she would call the [facility].    *Update*  Response received from Bryanna Godinez, medical assistant.  \"This will be rescheduled per Dr. Bucio thank you\"    "

## 2024-05-16 ENCOUNTER — ANESTHESIA EVENT (OUTPATIENT)
Dept: SURGERY | Facility: MEDICAL CENTER | Age: 84
End: 2024-05-16
Payer: MEDICARE

## 2024-05-16 ENCOUNTER — APPOINTMENT (OUTPATIENT)
Dept: RADIOLOGY | Facility: MEDICAL CENTER | Age: 84
End: 2024-05-16
Attending: ORTHOPAEDIC SURGERY
Payer: MEDICARE

## 2024-05-16 ENCOUNTER — ANESTHESIA (OUTPATIENT)
Dept: SURGERY | Facility: MEDICAL CENTER | Age: 84
End: 2024-05-16
Payer: MEDICARE

## 2024-05-16 VITALS
HEIGHT: 60 IN | DIASTOLIC BLOOD PRESSURE: 67 MMHG | RESPIRATION RATE: 16 BRPM | WEIGHT: 114.2 LBS | OXYGEN SATURATION: 94 % | SYSTOLIC BLOOD PRESSURE: 133 MMHG | TEMPERATURE: 97 F | HEART RATE: 74 BPM | BODY MASS INDEX: 22.42 KG/M2

## 2024-05-16 LAB
ANION GAP SERPL CALC-SCNC: 12 MMOL/L (ref 7–16)
BUN SERPL-MCNC: 12 MG/DL (ref 8–22)
CALCIUM SERPL-MCNC: 9.5 MG/DL (ref 8.5–10.5)
CHLORIDE SERPL-SCNC: 103 MMOL/L (ref 96–112)
CO2 SERPL-SCNC: 22 MMOL/L (ref 20–33)
CREAT SERPL-MCNC: 0.93 MG/DL (ref 0.5–1.4)
ERYTHROCYTE [DISTWIDTH] IN BLOOD BY AUTOMATED COUNT: 47 FL (ref 35.9–50)
GFR SERPLBLD CREATININE-BSD FMLA CKD-EPI: 60 ML/MIN/1.73 M 2
GLUCOSE SERPL-MCNC: 86 MG/DL (ref 65–99)
GRAM STN SPEC: NORMAL
HCT VFR BLD AUTO: 41.7 % (ref 37–47)
HGB BLD-MCNC: 13.7 G/DL (ref 12–16)
MCH RBC QN AUTO: 30.1 PG (ref 27–33)
MCHC RBC AUTO-ENTMCNC: 32.9 G/DL (ref 32.2–35.5)
MCV RBC AUTO: 91.6 FL (ref 81.4–97.8)
PLATELET # BLD AUTO: 285 K/UL (ref 164–446)
PMV BLD AUTO: 8.9 FL (ref 9–12.9)
POTASSIUM SERPL-SCNC: 4.1 MMOL/L (ref 3.6–5.5)
RBC # BLD AUTO: 4.55 M/UL (ref 4.2–5.4)
SIGNIFICANT IND 70042: NORMAL
SITE SITE: NORMAL
SODIUM SERPL-SCNC: 137 MMOL/L (ref 135–145)
SOURCE SOURCE: NORMAL
WBC # BLD AUTO: 8 K/UL (ref 4.8–10.8)

## 2024-05-16 PROCEDURE — 20680 REMOVAL OF IMPLANT DEEP: CPT | Mod: 59 | Performed by: ORTHOPAEDIC SURGERY

## 2024-05-16 PROCEDURE — 27641 PARTIAL REMOVAL OF FIBULA: CPT | Mod: LT | Performed by: ORTHOPAEDIC SURGERY

## 2024-05-16 RX ORDER — HYDRALAZINE HYDROCHLORIDE 20 MG/ML
5 INJECTION INTRAMUSCULAR; INTRAVENOUS
Status: DISCONTINUED | OUTPATIENT
Start: 2024-05-16 | End: 2024-05-16 | Stop reason: HOSPADM

## 2024-05-16 RX ORDER — SODIUM CHLORIDE, SODIUM LACTATE, POTASSIUM CHLORIDE, CALCIUM CHLORIDE 600; 310; 30; 20 MG/100ML; MG/100ML; MG/100ML; MG/100ML
INJECTION, SOLUTION INTRAVENOUS CONTINUOUS
Status: DISCONTINUED | OUTPATIENT
Start: 2024-05-16 | End: 2024-05-16 | Stop reason: HOSPADM

## 2024-05-16 RX ORDER — LABETALOL HYDROCHLORIDE 5 MG/ML
5 INJECTION, SOLUTION INTRAVENOUS
Status: DISCONTINUED | OUTPATIENT
Start: 2024-05-16 | End: 2024-05-16 | Stop reason: HOSPADM

## 2024-05-16 RX ORDER — TRAMADOL HYDROCHLORIDE 50 MG/1
50 TABLET ORAL EVERY 6 HOURS PRN
Qty: 20 TABLET | Refills: 0 | Status: SHIPPED | OUTPATIENT
Start: 2024-05-16 | End: 2024-05-21

## 2024-05-16 RX ORDER — OXYCODONE HCL 5 MG/5 ML
5 SOLUTION, ORAL ORAL
Status: DISCONTINUED | OUTPATIENT
Start: 2024-05-16 | End: 2024-05-16 | Stop reason: HOSPADM

## 2024-05-16 RX ORDER — ONDANSETRON 2 MG/ML
4 INJECTION INTRAMUSCULAR; INTRAVENOUS
Status: DISCONTINUED | OUTPATIENT
Start: 2024-05-16 | End: 2024-05-16 | Stop reason: HOSPADM

## 2024-05-16 RX ORDER — OXYCODONE HCL 5 MG/5 ML
10 SOLUTION, ORAL ORAL
Status: DISCONTINUED | OUTPATIENT
Start: 2024-05-16 | End: 2024-05-16 | Stop reason: HOSPADM

## 2024-05-16 RX ORDER — ONDANSETRON 2 MG/ML
INJECTION INTRAMUSCULAR; INTRAVENOUS PRN
Status: DISCONTINUED | OUTPATIENT
Start: 2024-05-16 | End: 2024-05-16 | Stop reason: SURG

## 2024-05-16 RX ORDER — CEPHALEXIN 500 MG/1
500 CAPSULE ORAL 2 TIMES DAILY
Qty: 28 CAPSULE | Refills: 0 | Status: SHIPPED | OUTPATIENT
Start: 2024-05-16 | End: 2024-05-24 | Stop reason: SDUPTHER

## 2024-05-16 RX ORDER — BUPIVACAINE HYDROCHLORIDE 2.5 MG/ML
INJECTION, SOLUTION EPIDURAL; INFILTRATION; INTRACAUDAL
Status: DISCONTINUED | OUTPATIENT
Start: 2024-05-16 | End: 2024-05-16 | Stop reason: HOSPADM

## 2024-05-16 RX ORDER — BUPIVACAINE HYDROCHLORIDE 5 MG/ML
INJECTION, SOLUTION EPIDURAL; INTRACAUDAL PRN
Status: DISCONTINUED | OUTPATIENT
Start: 2024-05-16 | End: 2024-05-16 | Stop reason: SURG

## 2024-05-16 RX ORDER — CEFAZOLIN SODIUM 1 G/3ML
2 INJECTION, POWDER, FOR SOLUTION INTRAMUSCULAR; INTRAVENOUS ONCE
Status: COMPLETED | OUTPATIENT
Start: 2024-05-16 | End: 2024-05-16

## 2024-05-16 RX ORDER — EPHEDRINE SULFATE 50 MG/ML
5 INJECTION, SOLUTION INTRAVENOUS
Status: DISCONTINUED | OUTPATIENT
Start: 2024-05-16 | End: 2024-05-16 | Stop reason: HOSPADM

## 2024-05-16 RX ORDER — DIPHENHYDRAMINE HYDROCHLORIDE 50 MG/ML
12.5 INJECTION INTRAMUSCULAR; INTRAVENOUS
Status: DISCONTINUED | OUTPATIENT
Start: 2024-05-16 | End: 2024-05-16 | Stop reason: HOSPADM

## 2024-05-16 RX ORDER — HALOPERIDOL 5 MG/ML
1 INJECTION INTRAMUSCULAR
Status: DISCONTINUED | OUTPATIENT
Start: 2024-05-16 | End: 2024-05-16 | Stop reason: HOSPADM

## 2024-05-16 RX ORDER — LIDOCAINE HYDROCHLORIDE 20 MG/ML
INJECTION, SOLUTION EPIDURAL; INFILTRATION; INTRACAUDAL; PERINEURAL PRN
Status: DISCONTINUED | OUTPATIENT
Start: 2024-05-16 | End: 2024-05-16 | Stop reason: SURG

## 2024-05-16 RX ADMIN — SODIUM CHLORIDE, POTASSIUM CHLORIDE, SODIUM LACTATE AND CALCIUM CHLORIDE: 600; 310; 30; 20 INJECTION, SOLUTION INTRAVENOUS at 12:46

## 2024-05-16 RX ADMIN — ONDANSETRON 4 MG: 2 INJECTION INTRAMUSCULAR; INTRAVENOUS at 13:23

## 2024-05-16 RX ADMIN — BUPIVACAINE HYDROCHLORIDE 15 ML: 5 INJECTION, SOLUTION EPIDURAL; INTRACAUDAL at 12:32

## 2024-05-16 RX ADMIN — FENTANYL CITRATE 50 MCG: 50 INJECTION, SOLUTION INTRAMUSCULAR; INTRAVENOUS at 13:49

## 2024-05-16 RX ADMIN — LIDOCAINE HYDROCHLORIDE 5 ML: 20 INJECTION, SOLUTION EPIDURAL; INFILTRATION; INTRACAUDAL at 12:32

## 2024-05-16 RX ADMIN — CEFAZOLIN 2 G: 1 INJECTION, POWDER, FOR SOLUTION INTRAMUSCULAR; INTRAVENOUS at 13:23

## 2024-05-16 ASSESSMENT — PAIN SCALES - GENERAL: PAIN_LEVEL: 0

## 2024-05-16 ASSESSMENT — PAIN DESCRIPTION - PAIN TYPE: TYPE: ACUTE PAIN

## 2024-05-16 NOTE — DISCHARGE INSTR - OTHER INFO
Weightbearing as tolerated to the left lower extremity.  Weightbearing needs to be limited until the nerve block is worn off.  Elevate the foot at rest help with swelling and pain relief.  Okay to remove dressings for showering after 3 days.  No submerging the wound until the wound is fully healed.  Return to clinic in 2 to 3 weeks for wound check and suture removal.

## 2024-05-16 NOTE — ANESTHESIA PROCEDURE NOTES
Peripheral Block    Date/Time: 5/16/2024 12:30 PM    Performed by: Harrison Kinney M.D.  Authorized by: Harrison Kinney M.D.    Patient Location:  Pre-op  Start Time:  5/16/2024 12:30 PM  End Time:  5/16/2024 12:34 PM  Reason for Block: at surgeon's request    patient identified, IV checked, site marked, risks and benefits discussed, surgical consent, monitors and equipment checked, pre-op evaluation and timeout performed    Patient Position:  Supine  Prep: ChloraPrep    Monitoring:  Heart rate, continuous pulse ox and cardiac monitor  Block Region:  Lower Extremity  Lower Extremity - Block Type:  SCIATIC nerve block, lateral approach    Laterality:  Left  Procedures: ultrasound guided  Image captured, interpreted and electronically stored.  Local Infiltration:  Lidocaine  Strength:  1 %  Dose:  3 ml  Block Type:  Single-shot  Needle Length:  100mm  Needle Gauge:  21 G  Needle Localization:  Ultrasound guidance  Ultrasound picture in chart  Injection Assessment:  Negative aspiration for heme, no paresthesia on injection, incremental injection and local visualized surrounding nerve on ultrasound  Evidence of intravascular injection: No     US Guided Sciatic Nerve Block   US probe placed several cm proximal to popliteal crease on posterior thigh and scanned caudad and cephalad until Sciatic Nerve (SN) identified superficial/lateral to popliteal artery.  Needle inserted lateral to probe in an in plane approach under direct visualization to a perineural position.  After negative aspiration LA injected with ease and visualized surrounding the SN.

## 2024-05-16 NOTE — DISCHARGE INSTRUCTIONS
HOME CARE INSTRUCTIONS    ACTIVITY: Rest and take it easy for the first 24 hours.  A responsible adult is recommended to remain with you during that time.  It is normal to feel sleepy.  We encourage you to not do anything that requires balance, judgment or coordination.    FOR 24 HOURS DO NOT:  Drive, operate machinery or run household appliances.  Drink beer or alcoholic beverages.  Make important decisions or sign legal documents.    SPECIAL INSTRUCTIONS:   Weightbearing as tolerated to the left lower extremity.   Weightbearing needs to be limited until the nerve block is worn off.   Elevate the foot at rest help with swelling and pain relief.   Okay to remove dressings for showering after 3 days.   No submerging the wound until the wound is fully healed.   Return to clinic in 2 to 3 weeks for wound check and suture removal.     DIET: To avoid nausea, slowly advance diet as tolerated, avoiding spicy or greasy foods for the first day.  Add more substantial food to your diet according to your physician's instructions.   INCREASE FLUIDS AND FIBER TO AVOID CONSTIPATION.      MEDICATIONS: Resume taking daily medication.  Take prescribed pain medication with food.  If no medication is prescribed, you may take non-aspirin pain medication if needed.  PAIN MEDICATION CAN BE VERY CONSTIPATING.  Take a stool softener or laxative such as senokot, pericolace, or milk of magnesia if needed.    Prescription given for tramadol. No pain medications given, you may take your pain medication when needed.     A follow-up appointment should be arranged with your doctor; call to schedule.    You should CALL YOUR PHYSICIAN if you develop:  Fever greater than 101 degrees F.  Pain not relieved by medication, or persistent nausea or vomiting.  Excessive bleeding (blood soaking through dressing) or unexpected drainage from the wound.  Extreme redness or swelling around the incision site, drainage of pus or foul smelling drainage.  Inability  to urinate or empty your bladder within 8 hours.  Problems with breathing or chest pain.    You should call 911 if you develop problems with breathing or chest pain.  If you are unable to contact your doctor or surgical center, you should go to the nearest emergency room or urgent care center.  Physician's telephone #: [phone] Dr Bucio    MILD FLU-LIKE SYMPTOMS ARE NORMAL.  YOU MAY EXPERIENCE GENERALIZED MUSCLE ACHES, THROAT IRRITATION, HEADACHE AND/OR SOME NAUSEA.    If any questions arise, call your doctor.  If your doctor is not available, please feel free to call the Surgical Center at [phone].  The Center is open Monday through Friday from 7AM to 7PM.      A registered nurse may call you a few days after your surgery to see how you are doing after your procedure.    You may also receive a survey in the mail within the next two weeks and we ask that you take a few moments to complete the survey and return it to us.  Our goal is to provide you with very good care and we value your comments.     Depression / Suicide Risk    As you are discharged from this [facility] facility, it is important to learn how to keep safe from harming yourself.    Recognize the warning signs:  Abrupt changes in personality, positive or negative- including increase in energy   Giving away possessions  Change in eating patterns- significant weight changes-  positive or negative  Change in sleeping patterns- unable to sleep or sleeping all the time   Unwillingness or inability to communicate  Depression  Unusual sadness, discouragement and loneliness  Talk of wanting to die  Neglect of personal appearance   Rebelliousness- reckless behavior  Withdrawal from people/activities they love  Confusion- inability to concentrate     If you or a loved one observes any of these behaviors or has concerns about self-harm, here's what you can do:  Talk about it- your feelings and reasons for harming yourself  Remove any means that you  might use to hurt yourself (examples: pills, rope, extension cords, firearm)  Get professional help from the community (Mental Health, Substance Abuse, psychological counseling)  Do not be alone:Call your Safe Contact- someone whom you trust who will be there for you.  Call your local CRISIS HOTLINE 686-1282 or [phone]  Call your local Children's Mobile Crisis Response Team [place] (871) 346-2913 or www.Zipongo  Call the toll free National Suicide Prevention Hotlines   National Suicide Prevention Lifeline 057-570-ILBX (7495)  [facility] 800-SUICIDE (925-5336)    I acknowledge receipt and understanding of these Home Care instructions.

## 2024-05-16 NOTE — OP REPORT
DATE OF OPERATION: 5/16/2024     PREOPERATIVE DIAGNOSIS: Healed left ankle fracture, chronically infected hardware left ankle    POSTOPERATIVE DIAGNOSIS: Same    PROCEDURE PERFORMED:   1.  Removal of deep hardware left ankle  2.  Saucerization of left fibula    SURGEON: Landon Bucio M.D.     ASSISTANT: None    ANESTHESIA: Local with sedation    SPECIMEN: None    ESTIMATED BLOOD LOSS: Less than 5 mL    IMPLANTS: None      INDICATIONS: The patient is a 84 y.o. female who presented with a previous left ankle fracture treated with open reduction internal fixation as well as syndesmotic fixation.  She went on to heal well.  She developed a draining sinus track over the last 6 or 7 months.  I recommended removal of deep hardware given the chronicity of this draining sinus and high likelihood of colonized/infected ankle and fibula.  I discussed the risks and benefits of the procedure which include but are not limited to risks of infection, wound healing complication, neurovascular injury, pain, and the medical risks of anesthesia including MI, stroke, and death.  Alternatives to surgery were also discussed, including non-operative management, which I did not recommend.  The patient was in agreement with the plan to proceed, and the informed consent was signed and documented.  I met with the patient pre-operatively and marked the operative extremity with their agreement.  We proceeded to the operating room.     DESCRIPTION OF PROCEDURE:  Patient was seen in the preoperative holding area on the day of surgery. The operative site was marked with my initials.  she was taken to the operating room and placed supine on the operative table.  Anesthesia was induced.  The operative extremity was prepped and draped in the normal sterile fashion.  Operative pause was conducted and the correct patient, site, side, procedure, and surgeon's initials on extremity were identified.  Previous incisions were utilized for hardware removal  on both the lateral and medial aspects of the ankle.  Incision was made taken down to the hardware.  All of the distal screws were backed out without difficulty.  A separate more proximal percutaneous incision was made to remove the proximal screws from.  The plate was removed on the lateral side.  The majority of the swelling and sinus tract seem to lead towards the syndesmotic screw in the metaphyseal portion of the plate.  No gross purulence was seen but a sample of the deep tissue that was adhered to the plate was sent for culture.  A curette and rongeur was used to remove a portion of the distal fibula near the syndesmotic screw.  This bone felt a little softened and was concerning for possible osteomyelitic changes.  After this cortical bone was removed the wound was thoroughly irrigated.  The remaining tissues appeared healthy and had normal capillary bleeding.  The medial screw was also taken out percutaneously and backed out without difficulty.  This is made through a separate medial incision.  Final fluoroscopic images were obtained after all hardware was removed.  The wound was closed layered fashion with 2-0 Monocryl and 3-0 nylon.  Sterile soft dressings were applied including compressive Ace wrap.  Patient woken the operating room and was taken to PACU in stable condition.    POSTOPERATIVE PLAN: Weightbearing as tolerated to the left lower extremity.  Weightbearing needs to be limited until the nerve block is worn off.  Elevate the foot at rest help with swelling and pain relief.  Okay to remove dressings for showering after 3 days.  No submerging the wound until the wound is fully healed.  Return to clinic in 2 to 3 weeks for wound check and suture removal.      ____________________________________   Landon Bucio M.D.   DD: 5/16/2024  2:15 PM

## 2024-05-16 NOTE — PROGRESS NOTES
Medication history reviewed with PT and her daughter at bedside    Med rec is complete per PT and daughter reporting    Allergies reviewed.     Patient denies any outpatient antibiotics in the last 30 days.     Patient is not taking anticoagulants.    Preferred pharmacy for this visit - [facility] ([phone])

## 2024-05-16 NOTE — OR NURSING
Arrived from OR [facility], received report from Dr. Kinney, Pt's VSS; denies N/V; states pain is at tolerable level. Dressing CDI to left ankle.     D/c orders received. IV dc'd. Pt changed into clothing with assistance. Discharge reviewed, Pt and family verbalized understanding and questions answered. Patient states ready to d/c home. Pt dc'd in w/c with RN.

## 2024-05-16 NOTE — ANESTHESIA PREPROCEDURE EVALUATION
Case: [id number] Date/Time: 05/16/24 1258    Procedures:       LEFT ANKLE HARDWARE REMOVAL AND LEFT ANKLE PARTIAL REMOVAL FIBULA      EXCISION, EXOSTOSIS    Pre-op diagnosis: Infected hardware in left leg    Location: [facility] OR 10 / SURGERY [facility]    Surgeons: Landon Bucio M.D.            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  Had cardiology referral but she cancelled.  Her PCP rec'd Cards eval last month which has not been done.  Discussed with surgeon ands will proceed with surgical block    Physical Exam    Airway   Mallampati: II  TM distance: >3 FB  Neck ROM: full       Cardiovascular - normal exam  Rhythm: regular  Rate: normal  (-) murmur     Dental - normal exam           Pulmonary - normal exam  Breath sounds clear to auscultation     Abdominal    Neurological - normal exam                   Anesthesia Plan    ASA 3       Plan - peripheral nerve block     Peripheral nerve block will be primary anesthetic          Induction: intravenous    Postoperative Plan: Postoperative administration of opioids is intended.    Pertinent diagnostic labs and testing reviewed    Informed Consent:    Anesthetic plan and risks discussed with patient.    Use of blood products discussed with: patient whom consented to blood products.

## 2024-05-17 NOTE — ANESTHESIA TIME REPORT
Anesthesia Start and Stop Event Times       Date Time Event    5/16/2024 1234 Ready for Procedure     1322 Anesthesia Start     1410 Anesthesia Stop          Responsible Staff  05/16/24      Name Role Begin End    Harrison Kinney M.D. Anesth 1322 1410          Overtime Reason:  no overtime (within assigned shift)    Comments:

## 2024-05-17 NOTE — ANESTHESIA POSTPROCEDURE EVALUATION
Patient: Susan Garcia    Procedure Summary       Date: 05/16/24 Room / Location: [place] [address] / SURGERY [facility]    Anesthesia Start: 1322 Anesthesia Stop: 1410    Procedure: LEFT ANKLE HARDWARE REMOVAL AND LEFT ANKLE PARTIAL REMOVAL FIBULA (Left: Ankle) Diagnosis: (Infected hardware in left leg)    Surgeons: Landon Bucio M.D. Responsible Provider:     Anesthesia Type: peripheral nerve block ASA Status: 3            Final Anesthesia Type: peripheral nerve block  Last vitals  BP   Blood Pressure : 133/67    Temp   36.1 °C (97 °F)    Pulse   74   Resp   16    SpO2   94 %      Anesthesia Post Evaluation    Patient location during evaluation: PACU  Patient participation: complete - patient participated  Level of consciousness: awake and alert  Pain score: 0    Airway patency: patent  Anesthetic complications: no  Cardiovascular status: hemodynamically stable  Respiratory status: acceptable  Hydration status: euvolemic    PONV: none          No notable events documented.     Nurse Pain Score: 0 (NPRS)

## 2024-05-17 NOTE — ANESTHESIA TIME REPORT
Anesthesia Start and Stop Event Times       Date Time Event    5/16/2024 1234 Ready for Procedure     1322 Anesthesia Start     1410 Anesthesia Stop          Responsible Staff    No responsible staff documented.       Overtime Reason:  no overtime (within assigned shift)    Comments:

## 2024-05-18 LAB
BACTERIA TISS AEROBE CULT: ABNORMAL
BACTERIA TISS AEROBE CULT: ABNORMAL
GRAM STN SPEC: ABNORMAL
SIGNIFICANT IND 70042: ABNORMAL
SITE SITE: ABNORMAL
SOURCE SOURCE: ABNORMAL

## 2024-05-19 LAB
BACTERIA SPEC ANAEROBE CULT: ABNORMAL
BACTERIA SPEC ANAEROBE CULT: ABNORMAL
SIGNIFICANT IND 70042: ABNORMAL
SITE SITE: ABNORMAL
SOURCE SOURCE: ABNORMAL

## (undated) DEVICE — PADDING CAST 6 IN STERILE - 6 X 4 YDS (24/CA)

## (undated) DEVICE — BLADE SURGICAL #10 - (50/BX)

## (undated) DEVICE — SET LEADWIRE 5 LEAD BEDSIDE DISPOSABLE ECG (1SET OF 5/EA)

## (undated) DEVICE — CANISTER SUCTION 3000ML MECHANICAL FILTER AUTO SHUTOFF MEDI-VAC NONSTERILE LF DISP  (40EA/CA)

## (undated) DEVICE — PAD LAP STERILE 18 X 18 - (5/PK 40PK/CA)

## (undated) DEVICE — DRAPE LARGE 3 QUARTER - (20/CA)

## (undated) DEVICE — LACTATED RINGERS INJ 1000 ML - (14EA/CA 60CA/PF)

## (undated) DEVICE — PACK LOWER EXTREMITY - (2/CA)

## (undated) DEVICE — DRILL BIT 2.8MM X 155MM CALIBRATED (8TX2=16)

## (undated) DEVICE — DRAPE C ARMOR (12EA/CA)

## (undated) DEVICE — GLOVE BIOGEL SZ 7 SURGICAL PF LTX - (50PR/BX 4BX/CA)

## (undated) DEVICE — CHLORAPREP 26 ML APPLICATOR - ORANGE TINT(25/CA)

## (undated) DEVICE — GLOVE BIOGEL INDICATOR SZ 8 SURGICAL PF LTX - (50/BX 4BX/CA)

## (undated) DEVICE — DRILL BIT 1.8MMX110MM GOLD (6TX2=12)

## (undated) DEVICE — TOURNIQUET CUFF 34 X 4 ONE PORT DISP - STERILE (10/BX)

## (undated) DEVICE — GLOVE BIOGEL PI ORTHO SZ 6 1/2 SURGICAL PF LF (40PR/BX)

## (undated) DEVICE — SET EXTENSION WITH 2 PORTS (48EA/CA) ***PART #2C8610 IS A SUBSTITUTE*****

## (undated) DEVICE — GOWN WARMING STANDARD FLEX - (30/CA)

## (undated) DEVICE — ELECTRODE DUAL RETURN W/ CORD - (50/PK)

## (undated) DEVICE — BANDAGE ELASTIC 6 HONEYCOMB - 6X5YD LF (20/CA)"

## (undated) DEVICE — DRESSING TRANSPARENT FILM TEGADERM 4 X 4.75" (50EA/BX)"

## (undated) DEVICE — GLOVE BIOGEL INDICATOR SZ 7.5 SURGICAL PF LTX - (50PR/BX 4BX/CA)

## (undated) DEVICE — SUTURE 3-0 VICRYL PLUS CT-1 - 36 INCH (36/BX)

## (undated) DEVICE — TOWEL STOP TIMEOUT SAFETY FLAG (40EA/CA)

## (undated) DEVICE — SODIUM CHL IRRIGATION 0.9% 1000ML (12EA/CA)

## (undated) DEVICE — TUBING CLEARLINK DUO-VENT - C-FLO (48EA/CA)

## (undated) DEVICE — SUCTION INSTRUMENT YANKAUER BULBOUS TIP W/O VENT (50EA/CA)

## (undated) DEVICE — GLOVE BIOGEL SZ 8 SURGICAL PF LTX - (50PR/BX 4BX/CA)

## (undated) DEVICE — DRAPE 36X28IN RAD CARM BND BG - (25/CA) O

## (undated) DEVICE — SUTURE 2-0 MONOCRYL PLUS UNDYED CT-1 1 X 36 (36EA/BX)"

## (undated) DEVICE — SLEEVE VASO CALF MED - (10PR/CA)

## (undated) DEVICE — PAD PREP 24 X 48 CUFFED - (100/CA)

## (undated) DEVICE — SUTURE 2-0 VICRYL PLUS CT-1 36 (36PK/BX)"

## (undated) DEVICE — GLOVE BIOGEL PI INDICATOR SZ 6.5 SURGICAL PF LF - (50/BX 4BX/CA)

## (undated) DEVICE — CONTAINER SPECIMEN BAG OR - STERILE 4 OZ W/LID (100EA/CA)

## (undated) DEVICE — COVER LIGHT HANDLE ALC PLUS DISP (18EA/BX)

## (undated) DEVICE — GOWN SURGEONS X-LARGE - DISP. (30/CA)

## (undated) DEVICE — BANDAGE ELASTIC 4 IN X 5 YDS - LATEX FREE(10/BX 5BX/CA)

## (undated) DEVICE — SUTURE GENERAL

## (undated) DEVICE — SUTURE 3-0 ETHILON FSLX 30 (36PK/BX)"

## (undated) DEVICE — GLOVE BIOGEL PI INDICATOR SZ 8.0 SURGICAL PF LF -(50/BX 4BX/CA)

## (undated) DEVICE — SENSOR OXIMETER ADULT SPO2 RD SET (20EA/BX)

## (undated) DEVICE — GLOVE SZ 6.5 BIOGEL PI MICRO - PF LF (50PR/BX)

## (undated) DEVICE — GLOVE BIOGEL PI ORTHO SZ 7.5 PF LF (40PR/BX)

## (undated) DEVICE — SLEEVE, VASO, THIGH, MED

## (undated) DEVICE — BANDAGE ELASTIC STERILE MATRIX 6 X 10 (20EA/CA)

## (undated) DEVICE — CANISTER SUCTION 3000ML MECHANICAL FILTER AUTO SHUTOFF MEDI-VAC NONSTERILE LF DISP (40EA/CA)

## (undated) DEVICE — SUTURE 3-0 ETHILON 3-0 PSLX 30 BLACK (36PK/BX)

## (undated) DEVICE — SPLINT PLASTER 5 IN X 30 IN - (50EA/BX 6BX/CA)

## (undated) DEVICE — TOWELS CLOTH SURGICAL - (4/PK 20PK/CA)

## (undated) DEVICE — GLOVE BIOGEL SZ 7.5 SURGICAL PF LTX - (50PR/BX 4BX/CA)